# Patient Record
Sex: FEMALE | Race: BLACK OR AFRICAN AMERICAN | NOT HISPANIC OR LATINO | URBAN - METROPOLITAN AREA
[De-identification: names, ages, dates, MRNs, and addresses within clinical notes are randomized per-mention and may not be internally consistent; named-entity substitution may affect disease eponyms.]

---

## 2017-01-27 ENCOUNTER — INPATIENT (INPATIENT)
Facility: HOSPITAL | Age: 32
LOS: 2 days | Discharge: HOME | End: 2017-01-30
Attending: OBSTETRICS & GYNECOLOGY | Admitting: OBSTETRICS & GYNECOLOGY

## 2017-01-27 DIAGNOSIS — Z3A.00 WEEKS OF GESTATION OF PREGNANCY NOT SPECIFIED: ICD-10-CM

## 2017-01-27 DIAGNOSIS — O26.90 PREGNANCY RELATED CONDITIONS, UNSPECIFIED, UNSPECIFIED TRIMESTER: ICD-10-CM

## 2017-01-28 ENCOUNTER — TRANSCRIPTION ENCOUNTER (OUTPATIENT)
Age: 32
End: 2017-01-28

## 2017-01-28 VITALS
DIASTOLIC BLOOD PRESSURE: 55 MMHG | RESPIRATION RATE: 17 BRPM | HEART RATE: 85 BPM | TEMPERATURE: 98 F | SYSTOLIC BLOOD PRESSURE: 111 MMHG

## 2017-01-28 LAB
BASOPHILS # BLD AUTO: 0.01 K/UL — SIGNIFICANT CHANGE UP (ref 0–0.2)
BASOPHILS NFR BLD AUTO: 0.2 % — SIGNIFICANT CHANGE UP (ref 0–2)
BLD GP AB SCN SERPL QL: NEGATIVE — SIGNIFICANT CHANGE UP
EOSINOPHIL # BLD AUTO: 0.03 K/UL — SIGNIFICANT CHANGE UP (ref 0–0.5)
EOSINOPHIL NFR BLD AUTO: 0.5 % — SIGNIFICANT CHANGE UP (ref 0–6)
HCT VFR BLD CALC: 31.7 % — LOW (ref 34.5–45)
HGB BLD-MCNC: 10.6 G/DL — LOW (ref 11.5–15.5)
IMM GRANULOCYTES NFR BLD AUTO: 0 % — SIGNIFICANT CHANGE UP (ref 0–1.5)
LYMPHOCYTES # BLD AUTO: 1.4 K/UL — SIGNIFICANT CHANGE UP (ref 1–3.3)
LYMPHOCYTES # BLD AUTO: 25 % — SIGNIFICANT CHANGE UP (ref 13–44)
MCHC RBC-ENTMCNC: 29.4 PG — SIGNIFICANT CHANGE UP (ref 27–34)
MCHC RBC-ENTMCNC: 33.4 % — SIGNIFICANT CHANGE UP (ref 32–36)
MCV RBC AUTO: 88.1 FL — SIGNIFICANT CHANGE UP (ref 80–100)
MONOCYTES # BLD AUTO: 0.46 K/UL — SIGNIFICANT CHANGE UP (ref 0–0.9)
MONOCYTES NFR BLD AUTO: 8.2 % — SIGNIFICANT CHANGE UP (ref 2–14)
NEUTROPHILS # BLD AUTO: 3.69 K/UL — SIGNIFICANT CHANGE UP (ref 1.8–7.4)
NEUTROPHILS NFR BLD AUTO: 66.1 % — SIGNIFICANT CHANGE UP (ref 43–77)
PLATELET # BLD AUTO: 246 K/UL — SIGNIFICANT CHANGE UP (ref 150–400)
PMV BLD: 12.4 FL — SIGNIFICANT CHANGE UP (ref 7–13)
RBC # BLD: 3.6 M/UL — LOW (ref 3.8–5.2)
RBC # FLD: 13.3 % — SIGNIFICANT CHANGE UP (ref 10.3–14.5)
RH IG SCN BLD-IMP: POSITIVE — SIGNIFICANT CHANGE UP
RH IG SCN BLD-IMP: POSITIVE — SIGNIFICANT CHANGE UP
T PALLIDUM AB TITR SER: NEGATIVE — SIGNIFICANT CHANGE UP
WBC # BLD: 5.59 K/UL — SIGNIFICANT CHANGE UP (ref 3.8–10.5)
WBC # FLD AUTO: 5.59 K/UL — SIGNIFICANT CHANGE UP (ref 3.8–10.5)

## 2017-01-28 RX ORDER — CITRIC ACID/SODIUM CITRATE 300-500 MG
15 SOLUTION, ORAL ORAL EVERY 4 HOURS
Qty: 0 | Refills: 0 | Status: DISCONTINUED | OUTPATIENT
Start: 2017-01-28 | End: 2017-01-28

## 2017-01-28 RX ORDER — IBUPROFEN 200 MG
600 TABLET ORAL EVERY 6 HOURS
Qty: 0 | Refills: 0 | Status: DISCONTINUED | OUTPATIENT
Start: 2017-01-28 | End: 2017-01-30

## 2017-01-28 RX ORDER — DIPHENHYDRAMINE HCL 50 MG
25 CAPSULE ORAL EVERY 6 HOURS
Qty: 0 | Refills: 0 | Status: DISCONTINUED | OUTPATIENT
Start: 2017-01-28 | End: 2017-01-30

## 2017-01-28 RX ORDER — LANOLIN
1 OINTMENT (GRAM) TOPICAL EVERY 6 HOURS
Qty: 0 | Refills: 0 | Status: DISCONTINUED | OUTPATIENT
Start: 2017-01-28 | End: 2017-01-30

## 2017-01-28 RX ORDER — SODIUM CHLORIDE 9 MG/ML
3 INJECTION INTRAMUSCULAR; INTRAVENOUS; SUBCUTANEOUS EVERY 8 HOURS
Qty: 0 | Refills: 0 | Status: DISCONTINUED | OUTPATIENT
Start: 2017-01-28 | End: 2017-01-30

## 2017-01-28 RX ORDER — SODIUM CHLORIDE 9 MG/ML
1000 INJECTION, SOLUTION INTRAVENOUS ONCE
Qty: 0 | Refills: 0 | Status: DISCONTINUED | OUTPATIENT
Start: 2017-01-28 | End: 2017-01-28

## 2017-01-28 RX ORDER — OXYTOCIN 10 UNIT/ML
41.67 VIAL (ML) INJECTION
Qty: 20 | Refills: 0 | Status: DISCONTINUED | OUTPATIENT
Start: 2017-01-28 | End: 2017-01-30

## 2017-01-28 RX ORDER — SODIUM CHLORIDE 9 MG/ML
1000 INJECTION, SOLUTION INTRAVENOUS
Qty: 0 | Refills: 0 | Status: DISCONTINUED | OUTPATIENT
Start: 2017-01-28 | End: 2017-01-28

## 2017-01-28 RX ORDER — TETANUS TOXOID, REDUCED DIPHTHERIA TOXOID AND ACELLULAR PERTUSSIS VACCINE, ADSORBED 5; 2.5; 8; 8; 2.5 [IU]/.5ML; [IU]/.5ML; UG/.5ML; UG/.5ML; UG/.5ML
0.5 SUSPENSION INTRAMUSCULAR ONCE
Qty: 0 | Refills: 0 | Status: DISCONTINUED | OUTPATIENT
Start: 2017-01-28 | End: 2017-01-30

## 2017-01-28 RX ORDER — AER TRAVELER 0.5 G/1
1 SOLUTION RECTAL; TOPICAL EVERY 4 HOURS
Qty: 0 | Refills: 0 | Status: DISCONTINUED | OUTPATIENT
Start: 2017-01-28 | End: 2017-01-30

## 2017-01-28 RX ORDER — SODIUM CHLORIDE 9 MG/ML
3 INJECTION INTRAMUSCULAR; INTRAVENOUS; SUBCUTANEOUS EVERY 8 HOURS
Qty: 0 | Refills: 0 | Status: DISCONTINUED | OUTPATIENT
Start: 2017-01-28 | End: 2017-01-28

## 2017-01-28 RX ORDER — OXYTOCIN 10 UNIT/ML
333.3 VIAL (ML) INJECTION
Qty: 20 | Refills: 0 | Status: DISCONTINUED | OUTPATIENT
Start: 2017-01-28 | End: 2017-01-28

## 2017-01-28 RX ORDER — KETOROLAC TROMETHAMINE 30 MG/ML
30 SYRINGE (ML) INJECTION ONCE
Qty: 0 | Refills: 0 | Status: DISCONTINUED | OUTPATIENT
Start: 2017-01-28 | End: 2017-01-30

## 2017-01-28 RX ORDER — OXYCODONE HYDROCHLORIDE 5 MG/1
5 TABLET ORAL
Qty: 0 | Refills: 0 | Status: DISCONTINUED | OUTPATIENT
Start: 2017-01-28 | End: 2017-01-30

## 2017-01-28 RX ORDER — MAGNESIUM HYDROXIDE 400 MG/1
30 TABLET, CHEWABLE ORAL
Qty: 0 | Refills: 0 | Status: DISCONTINUED | OUTPATIENT
Start: 2017-01-28 | End: 2017-01-30

## 2017-01-28 RX ORDER — IBUPROFEN 200 MG
1 TABLET ORAL
Qty: 0 | Refills: 0 | COMMUNITY
Start: 2017-01-28

## 2017-01-28 RX ORDER — PRAMOXINE HYDROCHLORIDE 150 MG/15G
1 AEROSOL, FOAM RECTAL EVERY 4 HOURS
Qty: 0 | Refills: 0 | Status: DISCONTINUED | OUTPATIENT
Start: 2017-01-28 | End: 2017-01-28

## 2017-01-28 RX ORDER — AER TRAVELER 0.5 G/1
1 SOLUTION RECTAL; TOPICAL EVERY 4 HOURS
Qty: 0 | Refills: 0 | Status: DISCONTINUED | OUTPATIENT
Start: 2017-01-28 | End: 2017-01-28

## 2017-01-28 RX ORDER — DIBUCAINE 1 %
1 OINTMENT (GRAM) RECTAL EVERY 4 HOURS
Qty: 0 | Refills: 0 | Status: DISCONTINUED | OUTPATIENT
Start: 2017-01-28 | End: 2017-01-28

## 2017-01-28 RX ORDER — HYDROCORTISONE 1 %
1 OINTMENT (GRAM) TOPICAL EVERY 4 HOURS
Qty: 0 | Refills: 0 | Status: DISCONTINUED | OUTPATIENT
Start: 2017-01-28 | End: 2017-01-28

## 2017-01-28 RX ORDER — INFLUENZA VIRUS VACCINE 15; 15; 15; 15 UG/.5ML; UG/.5ML; UG/.5ML; UG/.5ML
0.5 SUSPENSION INTRAMUSCULAR ONCE
Qty: 0 | Refills: 0 | Status: DISCONTINUED | OUTPATIENT
Start: 2017-01-28 | End: 2017-01-30

## 2017-01-28 RX ORDER — ACETAMINOPHEN 500 MG
3 TABLET ORAL
Qty: 0 | Refills: 0 | DISCHARGE
Start: 2017-01-28

## 2017-01-28 RX ORDER — PRAMOXINE HYDROCHLORIDE 150 MG/15G
1 AEROSOL, FOAM RECTAL EVERY 4 HOURS
Qty: 0 | Refills: 0 | Status: DISCONTINUED | OUTPATIENT
Start: 2017-01-28 | End: 2017-01-30

## 2017-01-28 RX ORDER — DOCUSATE SODIUM 100 MG
100 CAPSULE ORAL
Qty: 0 | Refills: 0 | Status: DISCONTINUED | OUTPATIENT
Start: 2017-01-28 | End: 2017-01-30

## 2017-01-28 RX ORDER — ACETAMINOPHEN 500 MG
975 TABLET ORAL EVERY 6 HOURS
Qty: 0 | Refills: 0 | Status: DISCONTINUED | OUTPATIENT
Start: 2017-01-28 | End: 2017-01-30

## 2017-01-28 RX ORDER — DIBUCAINE 1 %
1 OINTMENT (GRAM) RECTAL EVERY 4 HOURS
Qty: 0 | Refills: 0 | Status: DISCONTINUED | OUTPATIENT
Start: 2017-01-28 | End: 2017-01-30

## 2017-01-28 RX ORDER — OXYTOCIN 10 UNIT/ML
41.67 VIAL (ML) INJECTION
Qty: 20 | Refills: 0 | Status: DISCONTINUED | OUTPATIENT
Start: 2017-01-28 | End: 2017-01-28

## 2017-01-28 RX ORDER — SIMETHICONE 80 MG/1
80 TABLET, CHEWABLE ORAL EVERY 6 HOURS
Qty: 0 | Refills: 0 | Status: DISCONTINUED | OUTPATIENT
Start: 2017-01-28 | End: 2017-01-30

## 2017-01-28 RX ORDER — GLYCERIN ADULT
1 SUPPOSITORY, RECTAL RECTAL AT BEDTIME
Qty: 0 | Refills: 0 | Status: DISCONTINUED | OUTPATIENT
Start: 2017-01-28 | End: 2017-01-30

## 2017-01-28 RX ORDER — HYDROCORTISONE 1 %
1 OINTMENT (GRAM) TOPICAL EVERY 4 HOURS
Qty: 0 | Refills: 0 | Status: DISCONTINUED | OUTPATIENT
Start: 2017-01-28 | End: 2017-01-30

## 2017-01-28 RX ADMIN — SODIUM CHLORIDE 3 MILLILITER(S): 9 INJECTION INTRAMUSCULAR; INTRAVENOUS; SUBCUTANEOUS at 06:42

## 2017-01-28 NOTE — DISCHARGE NOTE OB - PLAN OF CARE
complete recovery regular diet, nothing per vagina, activities as tolerated, follow up with obstetrician in 6 weeks

## 2017-01-28 NOTE — DISCHARGE NOTE OB - PATIENT PORTAL LINK FT
“You can access the Follow[facility] Patient Portal, offered by [facility], by registering with the following website: [email or web address]”

## 2017-01-28 NOTE — DISCHARGE NOTE OB - CARE PROVIDER_API CALL
Kohanim, Behnam (MD), Obstetrics and Gynecology  [address] [facility] [address] [place] [address]  Phone: [phone]  Fax: [phone]

## 2017-01-28 NOTE — DISCHARGE NOTE OB - CARE PLAN
Principal Discharge DX:	Vaginal delivery  Goal:	complete recovery  Instructions for follow-up, activity and diet:	regular diet, nothing per vagina, activities as tolerated, follow up with obstetrician in 6 weeks

## 2017-01-28 NOTE — DISCHARGE NOTE OB - HOSPITAL COURSE
Patient was admitted in labor. She underwent vaginal delivery of a viable girl. She had uncomplicated hospital course. Patient was discharged home to follow up with obstetrician.

## 2017-01-28 NOTE — DISCHARGE NOTE OB - MEDICATION SUMMARY - MEDICATIONS TO TAKE
I will START or STAY ON the medications listed below when I get home from the hospital:    acetaminophen 325 mg oral tablet  -- 3 tab(s) by mouth every 6 hours  -- Indication: For pain    ibuprofen 600 mg oral tablet  -- 1 tab(s) by mouth every 6 hours  -- Indication: For pain    Prenatal Multivitamins with Folic Acid 1 mg oral tablet  -- 1 tab(s) by mouth once a day  -- Indication: For vitamins I will START or STAY ON the medications listed below when I get home from the hospital:    acetaminophen 325 mg oral tablet  -- 3 tab(s) by mouth every 6 hours  -- Indication: For pain    ibuprofen 600 mg oral tablet  -- 1 tab(s) by mouth every 6 hours, As Needed  -- Indication: For Vaginal delivery    Prenatal Multivitamins with Folic Acid 1 mg oral tablet  -- 1 tab(s) by mouth once a day  -- Indication: For Vaginal delivery

## 2017-01-29 RX ADMIN — Medication 600 MILLIGRAM(S): at 17:39

## 2017-01-29 RX ADMIN — Medication 1 TABLET(S): at 16:43

## 2017-01-29 RX ADMIN — Medication 600 MILLIGRAM(S): at 16:44

## 2017-01-30 VITALS
TEMPERATURE: 98 F | HEART RATE: 73 BPM | RESPIRATION RATE: 18 BRPM | DIASTOLIC BLOOD PRESSURE: 63 MMHG | OXYGEN SATURATION: 100 % | SYSTOLIC BLOOD PRESSURE: 109 MMHG

## 2017-01-30 RX ORDER — IBUPROFEN 200 MG
1 TABLET ORAL
Qty: 20 | Refills: 0
Start: 2017-01-30

## 2017-01-30 RX ADMIN — Medication 600 MILLIGRAM(S): at 02:00

## 2017-01-30 RX ADMIN — Medication 975 MILLIGRAM(S): at 02:00

## 2017-01-30 RX ADMIN — Medication 975 MILLIGRAM(S): at 00:54

## 2017-01-30 RX ADMIN — Medication 1 TABLET(S): at 12:55

## 2017-01-30 RX ADMIN — Medication 600 MILLIGRAM(S): at 00:54

## 2018-01-01 ENCOUNTER — EMERGENCY (EMERGENCY)
Facility: HOSPITAL | Age: 33
LOS: 1 days | Discharge: HOME | End: 2018-01-01
Attending: EMERGENCY MEDICINE | Admitting: EMERGENCY MEDICINE
Payer: MEDICAID

## 2018-01-01 VITALS
HEART RATE: 73 BPM | OXYGEN SATURATION: 100 % | TEMPERATURE: 98 F | DIASTOLIC BLOOD PRESSURE: 89 MMHG | RESPIRATION RATE: 18 BRPM | SYSTOLIC BLOOD PRESSURE: 109 MMHG

## 2018-01-01 VITALS
OXYGEN SATURATION: 100 % | RESPIRATION RATE: 16 BRPM | HEART RATE: 74 BPM | SYSTOLIC BLOOD PRESSURE: 128 MMHG | DIASTOLIC BLOOD PRESSURE: 60 MMHG

## 2018-01-01 LAB
ALBUMIN SERPL ELPH-MCNC: 3.8 G/DL — SIGNIFICANT CHANGE UP (ref 3.3–5)
ALP SERPL-CCNC: 84 U/L — SIGNIFICANT CHANGE UP (ref 40–120)
ALT FLD-CCNC: 18 U/L — SIGNIFICANT CHANGE UP (ref 4–33)
APTT BLD: 32 SEC — SIGNIFICANT CHANGE UP (ref 27.5–37.4)
AST SERPL-CCNC: 34 U/L — HIGH (ref 4–32)
BASOPHILS # BLD AUTO: 0.01 K/UL — SIGNIFICANT CHANGE UP (ref 0–0.2)
BASOPHILS NFR BLD AUTO: 0.2 % — SIGNIFICANT CHANGE UP (ref 0–2)
BILIRUB SERPL-MCNC: 0.4 MG/DL — SIGNIFICANT CHANGE UP (ref 0.2–1.2)
BUN SERPL-MCNC: 9 MG/DL — SIGNIFICANT CHANGE UP (ref 7–23)
CALCIUM SERPL-MCNC: 8.3 MG/DL — LOW (ref 8.4–10.5)
CHLORIDE SERPL-SCNC: 104 MMOL/L — SIGNIFICANT CHANGE UP (ref 98–107)
CO2 SERPL-SCNC: 26 MMOL/L — SIGNIFICANT CHANGE UP (ref 22–31)
CREAT SERPL-MCNC: 0.67 MG/DL — SIGNIFICANT CHANGE UP (ref 0.5–1.3)
EOSINOPHIL # BLD AUTO: 0.02 K/UL — SIGNIFICANT CHANGE UP (ref 0–0.5)
EOSINOPHIL NFR BLD AUTO: 0.3 % — SIGNIFICANT CHANGE UP (ref 0–6)
GLUCOSE SERPL-MCNC: 106 MG/DL — HIGH (ref 70–99)
HCT VFR BLD CALC: 33.6 % — LOW (ref 34.5–45)
HGB BLD-MCNC: 10.8 G/DL — LOW (ref 11.5–15.5)
IMM GRANULOCYTES # BLD AUTO: 0.01 # — SIGNIFICANT CHANGE UP
IMM GRANULOCYTES NFR BLD AUTO: 0.2 % — SIGNIFICANT CHANGE UP (ref 0–1.5)
INR BLD: 1.16 — SIGNIFICANT CHANGE UP (ref 0.88–1.17)
LIDOCAIN IGE QN: 33.8 U/L — SIGNIFICANT CHANGE UP (ref 7–60)
LYMPHOCYTES # BLD AUTO: 0.95 K/UL — LOW (ref 1–3.3)
LYMPHOCYTES # BLD AUTO: 15.7 % — SIGNIFICANT CHANGE UP (ref 13–44)
MCHC RBC-ENTMCNC: 27.1 PG — SIGNIFICANT CHANGE UP (ref 27–34)
MCHC RBC-ENTMCNC: 32.1 % — SIGNIFICANT CHANGE UP (ref 32–36)
MCV RBC AUTO: 84.4 FL — SIGNIFICANT CHANGE UP (ref 80–100)
MONOCYTES # BLD AUTO: 0.36 K/UL — SIGNIFICANT CHANGE UP (ref 0–0.9)
MONOCYTES NFR BLD AUTO: 5.9 % — SIGNIFICANT CHANGE UP (ref 2–14)
NEUTROPHILS # BLD AUTO: 4.71 K/UL — SIGNIFICANT CHANGE UP (ref 1.8–7.4)
NEUTROPHILS NFR BLD AUTO: 77.7 % — HIGH (ref 43–77)
NRBC # FLD: 0 — SIGNIFICANT CHANGE UP
PLATELET # BLD AUTO: 294 K/UL — SIGNIFICANT CHANGE UP (ref 150–400)
PMV BLD: 11.4 FL — SIGNIFICANT CHANGE UP (ref 7–13)
POTASSIUM SERPL-MCNC: 4.3 MMOL/L — SIGNIFICANT CHANGE UP (ref 3.5–5.3)
POTASSIUM SERPL-SCNC: 4.3 MMOL/L — SIGNIFICANT CHANGE UP (ref 3.5–5.3)
PROT SERPL-MCNC: 8.6 G/DL — HIGH (ref 6–8.3)
PROTHROM AB SERPL-ACNC: 12.9 SEC — SIGNIFICANT CHANGE UP (ref 9.8–13.1)
RBC # BLD: 3.98 M/UL — SIGNIFICANT CHANGE UP (ref 3.8–5.2)
RBC # FLD: 15.2 % — HIGH (ref 10.3–14.5)
SODIUM SERPL-SCNC: 141 MMOL/L — SIGNIFICANT CHANGE UP (ref 135–145)
WBC # BLD: 6.06 K/UL — SIGNIFICANT CHANGE UP (ref 3.8–10.5)
WBC # FLD AUTO: 6.06 K/UL — SIGNIFICANT CHANGE UP (ref 3.8–10.5)

## 2018-01-01 PROCEDURE — 76705 ECHO EXAM OF ABDOMEN: CPT | Mod: 26

## 2018-01-01 PROCEDURE — 99285 EMERGENCY DEPT VISIT HI MDM: CPT

## 2018-01-01 PROCEDURE — 71046 X-RAY EXAM CHEST 2 VIEWS: CPT | Mod: 26

## 2018-01-01 RX ORDER — ONDANSETRON 8 MG/1
4 TABLET, FILM COATED ORAL ONCE
Qty: 0 | Refills: 0 | Status: COMPLETED | OUTPATIENT
Start: 2018-01-01 | End: 2018-01-01

## 2018-01-01 RX ORDER — SODIUM CHLORIDE 9 MG/ML
1000 INJECTION INTRAMUSCULAR; INTRAVENOUS; SUBCUTANEOUS ONCE
Qty: 0 | Refills: 0 | Status: COMPLETED | OUTPATIENT
Start: 2018-01-01 | End: 2018-01-01

## 2018-01-01 RX ORDER — MORPHINE SULFATE 50 MG/1
6 CAPSULE, EXTENDED RELEASE ORAL ONCE
Qty: 0 | Refills: 0 | Status: DISCONTINUED | OUTPATIENT
Start: 2018-01-01 | End: 2018-01-01

## 2018-01-01 RX ADMIN — MORPHINE SULFATE 6 MILLIGRAM(S): 50 CAPSULE, EXTENDED RELEASE ORAL at 10:30

## 2018-01-01 RX ADMIN — ONDANSETRON 4 MILLIGRAM(S): 8 TABLET, FILM COATED ORAL at 10:30

## 2018-01-01 RX ADMIN — SODIUM CHLORIDE 1000 MILLILITER(S): 9 INJECTION INTRAMUSCULAR; INTRAVENOUS; SUBCUTANEOUS at 10:30

## 2018-01-01 NOTE — ED PROVIDER NOTE - OBJECTIVE STATEMENT
32y F with PMHx acid reflux presents to the ED for abd pain that radiates to back since 5AM this morning. States pain woke pt up. Pain is constant. Has nausea and diarrhea. Diarrhea x5 times. Last ate yesterday night. Supposed to be on acid reflux medication but has not been taking for 1 year. No allergies. LMP last month and no chance of pregnancy. No vaginal discharge or bleeding, vomiting, or urinary s/x. Last pregnant 1 year ago. Still has gallbladder 32y F with PMHx acid reflux off meds for a year presents to the ED for abd pain that radiates to back since 5AM this morning. States pain woke pt up. Pain is constant located in the epigastric radiate under the right breat associated w  nausea and NBNB diarrhea. Diarrhea x5 times. Last ate yesterday night at 9pm. No allergies. LMP last month and no chance of pregnancy. No vaginal discharge or bleeding, vomiting, or urinary s/x. Last pregnant 1 year ago. no cp/sob/palpitations. no travel no sick contact.

## 2018-01-01 NOTE — ED ADULT NURSE NOTE - OBJECTIVE STATEMENT
pt in intake. alert,oriented x3. skin warm,dry. c/o ruq pains since this am. denies n,v. med as ordered, eval by md. iv access,labs sent, pt to u/s

## 2018-01-01 NOTE — ED PROVIDER NOTE - PROGRESS NOTE DETAILS
pt sitting up states "I feel much much better" no nausea/vomit. pending us pt no distress. no nausea/vomit. no abdominal pain. soft nontender, nondistended abdomen. Dw w pt and provided copies of results to pt. she would like to go home. Recommend she come back for any fever/vomit or pain. pt comfortable  w plan

## 2018-01-01 NOTE — ED PROVIDER NOTE - PHYSICAL EXAMINATION
pt nontoxic, tearful w exam due to pain. non icteric. mmm. normal S1-S2 No resp distress. able to speak in full and clear sentences. no wheeze, rales or stridor. soft obese female tender  RUQ +murphys sign +voluntary guarding no rebound. no cvat pt nontoxic, tearful w exam due to pain. non icteric. mmm. normal S1-S2 No resp distress. able to speak in full and clear sentences. no wheeze, rales or stridor. soft obese female tender  RUQ +murphys sign  no guarding no rebound. no cvat

## 2018-01-01 NOTE — ED ADULT TRIAGE NOTE - CHIEF COMPLAINT QUOTE
Pt. c/o epigastric pain radiating to back with diarrhea starting around 5 AM. Denies n/v, cp or sob.

## 2018-11-01 ENCOUNTER — OUTPATIENT (OUTPATIENT)
Dept: OUTPATIENT SERVICES | Facility: HOSPITAL | Age: 33
LOS: 1 days | End: 2018-11-01
Payer: MEDICAID

## 2018-11-01 PROCEDURE — G9001: CPT

## 2018-11-21 ENCOUNTER — EMERGENCY (EMERGENCY)
Facility: HOSPITAL | Age: 33
LOS: 1 days | Discharge: HOME | End: 2018-11-21
Attending: EMERGENCY MEDICINE | Admitting: EMERGENCY MEDICINE
Payer: MEDICAID

## 2018-11-21 VITALS
OXYGEN SATURATION: 100 % | TEMPERATURE: 99 F | SYSTOLIC BLOOD PRESSURE: 119 MMHG | HEART RATE: 97 BPM | RESPIRATION RATE: 16 BRPM | DIASTOLIC BLOOD PRESSURE: 81 MMHG

## 2018-11-21 LAB
ALBUMIN SERPL ELPH-MCNC: 3.5 G/DL — SIGNIFICANT CHANGE UP (ref 3.3–5)
ALP SERPL-CCNC: 73 U/L — SIGNIFICANT CHANGE UP (ref 40–120)
ALT FLD-CCNC: 4 U/L — SIGNIFICANT CHANGE UP (ref 4–33)
APTT BLD: 32 SEC — SIGNIFICANT CHANGE UP (ref 27.5–36.3)
AST SERPL-CCNC: 12 U/L — SIGNIFICANT CHANGE UP (ref 4–32)
BASOPHILS # BLD AUTO: 0.01 K/UL — SIGNIFICANT CHANGE UP (ref 0–0.2)
BASOPHILS NFR BLD AUTO: 0.2 % — SIGNIFICANT CHANGE UP (ref 0–2)
BILIRUB SERPL-MCNC: 0.5 MG/DL — SIGNIFICANT CHANGE UP (ref 0.2–1.2)
BUN SERPL-MCNC: 5 MG/DL — LOW (ref 7–23)
CALCIUM SERPL-MCNC: 8.3 MG/DL — LOW (ref 8.4–10.5)
CHLORIDE SERPL-SCNC: 103 MMOL/L — SIGNIFICANT CHANGE UP (ref 98–107)
CO2 SERPL-SCNC: 25 MMOL/L — SIGNIFICANT CHANGE UP (ref 22–31)
CREAT SERPL-MCNC: 0.7 MG/DL — SIGNIFICANT CHANGE UP (ref 0.5–1.3)
EOSINOPHIL # BLD AUTO: 0.07 K/UL — SIGNIFICANT CHANGE UP (ref 0–0.5)
EOSINOPHIL NFR BLD AUTO: 1.2 % — SIGNIFICANT CHANGE UP (ref 0–6)
GLUCOSE SERPL-MCNC: 93 MG/DL — SIGNIFICANT CHANGE UP (ref 70–99)
HCT VFR BLD CALC: 34 % — LOW (ref 34.5–45)
HGB BLD-MCNC: 11.3 G/DL — LOW (ref 11.5–15.5)
IMM GRANULOCYTES # BLD AUTO: 0.02 # — SIGNIFICANT CHANGE UP
IMM GRANULOCYTES NFR BLD AUTO: 0.3 % — SIGNIFICANT CHANGE UP (ref 0–1.5)
INR BLD: 1.21 — HIGH (ref 0.88–1.17)
LYMPHOCYTES # BLD AUTO: 1.21 K/UL — SIGNIFICANT CHANGE UP (ref 1–3.3)
LYMPHOCYTES # BLD AUTO: 21 % — SIGNIFICANT CHANGE UP (ref 13–44)
MCHC RBC-ENTMCNC: 28.7 PG — SIGNIFICANT CHANGE UP (ref 27–34)
MCHC RBC-ENTMCNC: 33.2 % — SIGNIFICANT CHANGE UP (ref 32–36)
MCV RBC AUTO: 86.3 FL — SIGNIFICANT CHANGE UP (ref 80–100)
MONOCYTES # BLD AUTO: 0.52 K/UL — SIGNIFICANT CHANGE UP (ref 0–0.9)
MONOCYTES NFR BLD AUTO: 9 % — SIGNIFICANT CHANGE UP (ref 2–14)
NEUTROPHILS # BLD AUTO: 3.94 K/UL — SIGNIFICANT CHANGE UP (ref 1.8–7.4)
NEUTROPHILS NFR BLD AUTO: 68.3 % — SIGNIFICANT CHANGE UP (ref 43–77)
NRBC # FLD: 0 — SIGNIFICANT CHANGE UP
PLATELET # BLD AUTO: 248 K/UL — SIGNIFICANT CHANGE UP (ref 150–400)
PMV BLD: 11.6 FL — SIGNIFICANT CHANGE UP (ref 7–13)
POTASSIUM SERPL-MCNC: 4.3 MMOL/L — SIGNIFICANT CHANGE UP (ref 3.5–5.3)
POTASSIUM SERPL-SCNC: 4.3 MMOL/L — SIGNIFICANT CHANGE UP (ref 3.5–5.3)
PROT SERPL-MCNC: 8.4 G/DL — HIGH (ref 6–8.3)
PROTHROM AB SERPL-ACNC: 13.9 SEC — HIGH (ref 9.8–13.1)
RBC # BLD: 3.94 M/UL — SIGNIFICANT CHANGE UP (ref 3.8–5.2)
RBC # FLD: 14.2 % — SIGNIFICANT CHANGE UP (ref 10.3–14.5)
SODIUM SERPL-SCNC: 137 MMOL/L — SIGNIFICANT CHANGE UP (ref 135–145)
WBC # BLD: 5.77 K/UL — SIGNIFICANT CHANGE UP (ref 3.8–10.5)
WBC # FLD AUTO: 5.77 K/UL — SIGNIFICANT CHANGE UP (ref 3.8–10.5)

## 2018-11-21 PROCEDURE — 99284 EMERGENCY DEPT VISIT MOD MDM: CPT | Mod: 25

## 2018-11-21 PROCEDURE — 93971 EXTREMITY STUDY: CPT | Mod: 26,LT

## 2018-11-21 RX ORDER — CYCLOBENZAPRINE HYDROCHLORIDE 10 MG/1
1 TABLET, FILM COATED ORAL
Qty: 9 | Refills: 0 | OUTPATIENT
Start: 2018-11-21 | End: 2018-11-23

## 2018-11-21 RX ORDER — APIXABAN 2.5 MG/1
1 TABLET, FILM COATED ORAL
Qty: 60 | Refills: 0
Start: 2018-11-21

## 2018-11-21 RX ORDER — APIXABAN 2.5 MG/1
1 TABLET, FILM COATED ORAL
Qty: 60 | Refills: 0 | OUTPATIENT
Start: 2018-11-21

## 2018-11-21 RX ORDER — IBUPROFEN 200 MG
1 TABLET ORAL
Qty: 15 | Refills: 0
Start: 2018-11-21 | End: 2018-11-25

## 2018-11-21 RX ORDER — DIAZEPAM 5 MG
1 TABLET ORAL
Qty: 6 | Refills: 0
Start: 2018-11-21

## 2018-11-21 RX ORDER — DIAZEPAM 5 MG
1 TABLET ORAL
Qty: 6 | Refills: 0
Start: 2018-11-21 | End: 2018-11-22

## 2018-11-21 RX ORDER — IBUPROFEN 200 MG
800 TABLET ORAL ONCE
Qty: 0 | Refills: 0 | Status: COMPLETED | OUTPATIENT
Start: 2018-11-21 | End: 2018-11-21

## 2018-11-21 RX ORDER — APIXABAN 2.5 MG/1
10 TABLET, FILM COATED ORAL
Qty: 1 | Refills: 0
Start: 2018-11-21 | End: 2018-12-20

## 2018-11-21 RX ADMIN — Medication 800 MILLIGRAM(S): at 05:37

## 2018-11-21 NOTE — ED ADULT TRIAGE NOTE - CHIEF COMPLAINT QUOTE
Pt comes in for c/o left leg pain x1 week, reporting that she thinks she pulled a muscle and the pain continues to progress. Pt appears in no acute distress, pt ambulatory in triage and vs as noted

## 2018-11-21 NOTE — ED POST DISCHARGE NOTE - REASON FOR FOLLOW-UP
Other Pt called that [facility] didn't received rx for Eliquis - the pharmacy called by the writer - closed, unable to verify if rx there, another [facility] provided by [facility] [place] [facility], rx sent  but as per pharmacist,. rx already pending at [facility] and will not be covered by the insurance as there is already claim on it ([facility] closed, unable to d/c medications); I called the pt and advised to either come to the ER to obtain dose for today and she could  rx from [facility] tomorrow or I could sent rx for 2 pills (price $20) and pt could  the rest tomorrow. pt choose to return to er.

## 2018-11-21 NOTE — ED PROVIDER NOTE - CARE PLAN
Principal Discharge DX:	DVT (deep venous thrombosis)  Assessment and plan of treatment:	Follow up with your primary physician for a post hospital visit within 48 hours, taking all results from the ER to be reviewed. Start eliquis 10 mg twice a day for the first 7 days then change to 5 mg twice a day.  If any concerning or new signs or symptoms return to the ER Principal Discharge DX:	DVT (deep venous thrombosis)  Assessment and plan of treatment:	Follow up with your primary physician for a post hospital visit within 48 hours, taking all results from the ER to be reviewed. Start eliquis 10 mg twice a day for the first 7 days then change to 5 mg twice a day.  If any concerning or new signs or symptoms return to the ER. Heme referral list provided

## 2018-11-21 NOTE — ED ADULT NURSE NOTE - OBJECTIVE STATEMENT
received pt to intake AOX3 in no apparent distress. Pt c/o L calf pain x2 weeks and worsening. Pt states pain is worse when she moves leg or attempts to put pressure on it. Pt denies any traum or injury. Pt denies any other symptoms such as chest pain, weakness, difficulty breathing, impaired gait. Pt able to ambulate to stretcher with minimal assistance, no obvious deformity or bruising, no neuro deficits. pt appears uncomfortable medicated for pain as per provider order, pt taken to US will continue to monitor for pain

## 2018-11-21 NOTE — ED PROVIDER NOTE - PROGRESS NOTE DETAILS
[facility], ok with pt being dc on eliquis.   Ok to dc home as dw attending jay , rx sent to pharmacy [facility], ok with pt being dc on eliquis.   Ok to dc home as dw attending Scofi , rx sent to pharmacy

## 2018-11-21 NOTE — ED POST DISCHARGE NOTE - ADDITIONAL DOCUMENTATION
Of note, when I sent Rx's through, defaulted to [facility] (pharmacy is closed at time of sending).  Message left with ED Admin Team for f/u in am to cancel all Rxs at [facility].  Ultimately, sent Rxs to [facility] without incident as per above.

## 2018-11-21 NOTE — ED POST DISCHARGE NOTE - RESULT SUMMARY
Pt. contacted the ED; states Rx's are not at her pharmacy; pt provides info for [facility] pharmacy [id number].  I reviewed Prescription Writer; pt had Rxs for Eliquis, Valium, and ibuprofen sent to [facility].  I advised pt not to take ibuprofen or other NSAIDS due to increased risk of bleeding; pt advised to instead take Tylenol PRN for discomfort and follow package instructions.  Rxs for Valium and Eliquis were sent to [facility] at info provided by pt; Rxs went through with "successful" status showing.  Pt. was advised to f/u with her PMD in the next few days.  Pt. verbalized understanding to all as discussed.

## 2018-11-21 NOTE — ED PROVIDER NOTE - OBJECTIVE STATEMENT
32 y/o female no PMH presents to ED c/o left leg/calf pain x 2 weeks. Pt. states over the past 2 weeks she has been experiencing worsening pain and cramping sensation to her left calf - states 2 weeks ago had a bad calf cramp but evantually went away but since has been experiencing pain to area, pain worse at times with ambulation - has been taking aspirin with minimal relief. Denies recent trauma or ovruse- states is on her feet all day for work. Denies fever chills redness swelling trauma to area cp sob, recent travel or ocps. 34 y/o female no PMH presents to ED c/o left leg/calf pain x 2 weeks. Pt. states over the past 2 weeks she has been experiencing worsening pain and cramping sensation to her left calf - states 2 weeks ago had a bad calf cramp but eventually went away but since has been experiencing pain to area, pain worse at times with ambulation - has been taking aspirin with minimal relief. Denies recent trauma or overuse- states is on her feet all day for work. Denies fever chills redness swelling trauma to area cp sob, recent travel.    PMD: Fuad Horton [phone] (office)      [phone] (cell/asnwering service)

## 2018-11-21 NOTE — ED PROVIDER NOTE - PLAN OF CARE
Follow up with your primary physician for a post hospital visit within 48 hours, taking all results from the ER to be reviewed. Start eliquis 10 mg twice a day for the first 7 days then change to 5 mg twice a day.  If any concerning or new signs or symptoms return to the ER Follow up with your primary physician for a post hospital visit within 48 hours, taking all results from the ER to be reviewed. Start eliquis 10 mg twice a day for the first 7 days then change to 5 mg twice a day.  If any concerning or new signs or symptoms return to the ER. Heme referral list provided

## 2018-11-21 NOTE — ED PROVIDER NOTE - MEDICAL DECISION MAKING DETAILS
34 y/o female c/o left calf/leg pain  -possible msk/cramp/strain, r/o DVT  -us duplex, nsaids  -pmd follow up

## 2018-11-21 NOTE — ED PROVIDER NOTE - PHYSICAL EXAMINATION
Gen: Well appearing in NAD  Head: NC/AT  Neck: trachea midline  Resp:  No distress  Ext: no deformities  Neuro:  A&O appears non focal  Skin:  Warm and dry as visualized  Psych:  Normal affect and mood    left leg: no swelling or obv def. no redness, no signs of trauma.+ pain to calf on palpation, no defomrity palpated. sensations intact.

## 2018-11-22 RX ORDER — APIXABAN 2.5 MG/1
1 TABLET, FILM COATED ORAL
Qty: 60 | Refills: 0
Start: 2018-11-22

## 2018-11-27 DIAGNOSIS — Z71.89 OTHER SPECIFIED COUNSELING: ICD-10-CM

## 2018-12-10 ENCOUNTER — APPOINTMENT (OUTPATIENT)
Dept: VASCULAR SURGERY | Facility: CLINIC | Age: 33
End: 2018-12-10
Payer: MEDICARE

## 2018-12-10 ENCOUNTER — TRANSCRIPTION ENCOUNTER (OUTPATIENT)
Age: 33
End: 2018-12-10

## 2018-12-10 VITALS
DIASTOLIC BLOOD PRESSURE: 66 MMHG | BODY MASS INDEX: 45.07 KG/M2 | SYSTOLIC BLOOD PRESSURE: 97 MMHG | HEIGHT: 64 IN | HEART RATE: 76 BPM | TEMPERATURE: 98.5 F | WEIGHT: 264 LBS

## 2018-12-10 PROCEDURE — 93970 EXTREMITY STUDY: CPT

## 2018-12-10 PROCEDURE — 99203 OFFICE O/P NEW LOW 30 MIN: CPT

## 2019-01-03 ENCOUNTER — EMERGENCY (EMERGENCY)
Facility: HOSPITAL | Age: 34
LOS: 1 days | Discharge: HOME | End: 2019-01-03
Attending: EMERGENCY MEDICINE | Admitting: EMERGENCY MEDICINE
Payer: MEDICAID

## 2019-01-03 VITALS
HEART RATE: 102 BPM | TEMPERATURE: 99 F | SYSTOLIC BLOOD PRESSURE: 117 MMHG | DIASTOLIC BLOOD PRESSURE: 70 MMHG | RESPIRATION RATE: 18 BRPM | OXYGEN SATURATION: 100 %

## 2019-01-03 VITALS — HEART RATE: 88 BPM

## 2019-01-03 PROCEDURE — 99283 EMERGENCY DEPT VISIT LOW MDM: CPT | Mod: 25

## 2019-01-03 RX ORDER — ACETAMINOPHEN 500 MG
975 TABLET ORAL ONCE
Qty: 0 | Refills: 0 | Status: COMPLETED | OUTPATIENT
Start: 2019-01-03 | End: 2019-01-03

## 2019-01-03 RX ORDER — IBUPROFEN 200 MG
1 TABLET ORAL
Qty: 20 | Refills: 0
Start: 2019-01-03

## 2019-01-03 RX ORDER — AMOXICILLIN 250 MG/5ML
1 SUSPENSION, RECONSTITUTED, ORAL (ML) ORAL
Qty: 20 | Refills: 0 | OUTPATIENT
Start: 2019-01-03 | End: 2019-01-12

## 2019-01-03 RX ORDER — AMOXICILLIN 250 MG/5ML
1 SUSPENSION, RECONSTITUTED, ORAL (ML) ORAL
Qty: 20 | Refills: 0
Start: 2019-01-03 | End: 2019-01-12

## 2019-01-03 RX ORDER — AMOXICILLIN 250 MG/5ML
500 SUSPENSION, RECONSTITUTED, ORAL (ML) ORAL ONCE
Qty: 0 | Refills: 0 | Status: COMPLETED | OUTPATIENT
Start: 2019-01-03 | End: 2019-01-03

## 2019-01-03 RX ADMIN — Medication 975 MILLIGRAM(S): at 09:42

## 2019-01-03 RX ADMIN — Medication 500 MILLIGRAM(S): at 09:42

## 2019-01-03 NOTE — ED PROVIDER NOTE - MEDICAL DECISION MAKING DETAILS
34yo F PMHX of DVT dx in november on anticoagulation p/w cc of sore throat. Noted b/l exudate with no cough. Concern for pharyngitis/tonsilitis. Analgesia, Antibiotics.

## 2019-01-03 NOTE — ED PROVIDER NOTE - NS ED ROS FT
CONSTITUTIONAL: +fevers, +chills  Mouth/Throat: + sore throat  Cardiovascular: No Chest pain  Respiratory: No SOB  Gastrointestinal: No n/v/d, no abd pain  Genitourinary: no dysuria, no hematuria  SKIN: no rashes.

## 2019-01-03 NOTE — ED PROVIDER NOTE - NSFOLLOWUPINSTRUCTIONS_ED_ALL_ED_FT
Rest and plenty of fluids.  Continue Amoxicillin 500mg 2 times per day.  May take Motrin 600mg every 6 hours as needed for pain. Take with food.

## 2019-01-03 NOTE — ED PROVIDER NOTE - MUSCULOSKELETAL, MLM
Spine appears normal, range of motion is not limited, no muscle or joint tenderness. No calf swelling or tenderness. + distal pulses.

## 2019-01-03 NOTE — ED PROVIDER NOTE - PHYSICAL EXAMINATION
General: no apparent distress, tired appearing   HEENT: Swollen tonsils BL w/ extensive exudates on both tonsils, uvula midline  Neck:  +tender lymphadenopathy, full range of motion, no nuchal rigidity  CV: regular rhythm, normal S1 and S2 with no murmur  Resp: lungs clear to auscultation bilaterally, symmetric chest wall rise  Abd: soft, nontender, nondistended, normoactive bowel sounds, no splenomegaly or hepatomegaly appreciated   : no CVA tenderness  Neuro: moving all extremities

## 2019-01-03 NOTE — ED PROVIDER NOTE - OBJECTIVE STATEMENT
32yo F PMHX of DVT dx in november on anticoagulation p/w cc of sore throat    Pt has had 4 days of sore throat, fevers and chills, no cough, reduced PO intake due to sore throat. Child is sick with similar sx. Took tylenol last night which helped. No N/V/D.

## 2019-01-03 NOTE — ED PROVIDER NOTE - ATTENDING CONTRIBUTION TO CARE
Pt was seen and evaluated by me. Pt is a 34 y/o female with PMHx of DVT presenting with sore throat X 4 days. Pt states over the past 4 days having a sore throat and tactile fevers. Pt denies any cough, SOB, chest pain, nausea, vomiting, or abd pain. No difficulty swallowing .Lungs CTA b/l. RRR. Abd soft, non-tender. No calf swelling or tenderness. + distal pulses. Noted to have erythema to posterior pharynx with b/l tonsilar exudate. Uvula midline.

## 2019-01-03 NOTE — ED PROVIDER NOTE - ENMT, MLM
Airway patent, Nasal mucosa clear. Mouth with normal mucosa. Uvula midline. b/l tonsilar hypertrophy with b/l exudate.

## 2019-01-03 NOTE — ED PROVIDER NOTE - RESPIRATORY NEGATIVE STATEMENT, MLM
May 11, 2017        Maria C Yang, JENN  [address] [facility] [address]             [facility]  [address] [place] [address] [address]  [facility] [address]  Phone: [phone]   Patient: Balaji Langford   MR Number: [id number]   YOB: 1960   Date of Visit: 5/11/2017       Dear Dr. Yang:    Thank you for referring Balaji Langford to me for evaluation. Below are the relevant portions of my assessment and plan of care.            If you have questions, please do not hesitate to call me. I look forward to following Balaji Dinero along with you.    Sincerely,      Jay Stringer Jr., MD           CC  No Recipients        no chest pain, no cough, and no shortness of breath.

## 2019-03-04 ENCOUNTER — APPOINTMENT (OUTPATIENT)
Dept: VASCULAR SURGERY | Facility: CLINIC | Age: 34
End: 2019-03-04
Payer: MEDICAID

## 2019-03-04 VITALS
DIASTOLIC BLOOD PRESSURE: 69 MMHG | SYSTOLIC BLOOD PRESSURE: 93 MMHG | WEIGHT: 264 LBS | BODY MASS INDEX: 45.07 KG/M2 | HEIGHT: 64 IN | TEMPERATURE: 98.7 F | HEART RATE: 73 BPM

## 2019-03-04 PROCEDURE — 99213 OFFICE O/P EST LOW 20 MIN: CPT

## 2019-03-04 PROCEDURE — 93970 EXTREMITY STUDY: CPT

## 2019-03-04 NOTE — HISTORY OF PRESENT ILLNESS
[FreeTextEntry1] : I have just had the pleasure of seeing Mrs. Coleen Hagan ( [birth year]) in follow up for left lower extremity deep vein thrombosis.\par \par Mrs. Hagan is a pleasant 33-year-old lady with a history of two spontaneous abortions who presented three months ago with a left popliteal vein DVT that was diagnosed on ultrasonography at [facility] on 18. She developed left leg pain a few days prior, which prompted her to seek evaluation. She denied any prior history of travel, trauma, periods of immobility or other predisposing factors. She denied any prior history of DVT, SVT or PE. \par \par During her last visit, I instructed her to undergo evaluation by Hematology for a hypercoagulable workup, which she has not yet done. She finished a three-month course of Eliquis yesterday.\par \par She complains of achiness and heaviness in the left leg, which is improved by elevation. \par \par Medial history is otherwise significant for GERD and obesity (BMI 45)\par \par Medications: None\par \par Allergies: NKDA\par \par Social history: Non-smoker\par \par FH: NC\par \par

## 2019-03-04 NOTE — ASSESSMENT
[FreeTextEntry1] : In summary, Mrs. Hagan presents with a left leg DVT. I instructed her to follow up with Dr. Montgomery (Hematology) for a hypercoagulable workup. Contact information was provided. \par \par I renewed her Eliquis rx and instructed her to  her medication today and continue to take anticoagulation until evaluation by Hematology is completed. \par \par She will return to follow up in three months.

## 2019-03-04 NOTE — PHYSICAL EXAM
[de-identified] : On physical examination the patient is NAD. Neurologically intact. The lungs are clear to auscultation and the heart has a regular rate and rhythm. The abdomen is soft, without tenderness or pulsatile mass. Bilateral femoral and pedal pulses are palpable. \par \par A lower extremity venous duplex ultrasound was performed in the office today, which showed:\par -Right: No evidence of DVT/SVT \par -Left: occlusive acute-subacute DVT in the popliteal vein\par

## 2019-03-11 ENCOUNTER — APPOINTMENT (OUTPATIENT)
Dept: VASCULAR SURGERY | Facility: CLINIC | Age: 34
End: 2019-03-11

## 2019-03-28 NOTE — ED ADULT TRIAGE NOTE - LOCATION:
[facility] EMERGENCY DEPARTMENT ([facility])    3/28/19       History     Chief Complaint   Patient presents with     Back Pain     pain across lower back, no trauma, pain radiates down both legs for the past 3 days     The history is provided by the patient. A  was used (Official iPad [place] ).     Sharon Nur is a 36 year old female with a medical history significant for lumbar disc disease with radiculopathy and spinal stenosis of the lumbar region who presents to the Emergency Department for evaluation of lower back pain that radiates down her bilateral legs to her feet.  Patient reports she first had this pain in 2015; at that time, she was given medications that resolved the pain.  She is unable to remember the name of this medication.  She has not had this pain again until 3 days ago; the pain has been constant and worsening since that time.  The patient reports she was praying and bending over when the pain started.  She reports the pain is worse with movement, but especially worse with bending forward.  The patient denies any history of back surgeries or past back injuries.  She denies any loss of bowel or bladder control and denies any saddle anesthesia.  The patient has not taken any medications for her current pain.    I have reviewed the Medications, Allergies, Past Medical and Surgical History, and Social History in the [employer] system.    Past Medical History:   Diagnosis Date     Spinal stenosis        Past Surgical History:   Procedure Laterality Date     NO HISTORY OF SURGERY         History reviewed. No pertinent family history.    Social History     Tobacco Use     Smoking status: Never Smoker     Smokeless tobacco: Never Used   Substance Use Topics     Alcohol use: No     Alcohol/week: 0.0 oz       No current facility-administered medications for this encounter.      Current Outpatient Medications   Medication     cyclobenzaprine (FLEXERIL) 10 MG tablet      ibuprofen (ADVIL/MOTRIN) 600 MG tablet      No Known Allergies      Review of Systems   Constitutional: Negative for fever.   HENT: Negative for congestion.    Eyes: Negative for redness.   Respiratory: Negative for shortness of breath.    Cardiovascular: Negative for chest pain.   Gastrointestinal: Negative for abdominal pain.   Genitourinary: Negative for difficulty urinating.   Musculoskeletal: Positive for back pain (lower with radiation down bilateral legs). Negative for arthralgias and neck stiffness.   Skin: Negative for color change.   Neurological: Negative for headaches.        Negative for loss of bowel or bladder control  Negative for saddle anesthesia   Psychiatric/Behavioral: Negative for confusion.   All other systems reviewed and are negative.      Physical Exam   BP: 103/68  Pulse: 89  Temp: 97.8  F (36.6  C)  Resp: 16  Weight: 81 kg (178 lb 8 oz)  SpO2: 100 %      Physical Exam   Constitutional: No distress.   HENT:   Head: Atraumatic.   Mouth/Throat: Oropharynx is clear and moist. No oropharyngeal exudate.   Eyes: Pupils are equal, round, and reactive to light. No scleral icterus.   Cardiovascular: Normal heart sounds and intact distal pulses.   Pulmonary/Chest: Breath sounds normal. No respiratory distress.   Abdominal: Soft. Bowel sounds are normal. There is no tenderness.   Musculoskeletal: She exhibits no edema.        Lumbar back: She exhibits tenderness and pain. She exhibits normal range of motion.        Back:    Skin: Skin is warm. No rash noted. She is not diaphoretic.       ED Course   10:32 AM  The patient was seen and examined by Andry Bush MD in Room ED20.        Procedures                 Labs Ordered and Resulted from Time of ED Arrival Up to the Time of Departure from the ED - No data to display         Assessments & Plan (with Medical Decision Making)   36-year-old female presents with 3-day history of lower back pain with radiation into the legs.  Exam revealed tenderness over  the bilateral paraspinous musculature of her lumbosacral spine.  Motor and sensory exam of the lower extremities is entirely normal.  Patient was treated with Toradol IM with some relief.  She will be discharged with ibuprofen and Flexeril as well as using ice alternating with heat.  I recommended follow-up with primary physician in the next week.    I have reviewed the nursing notes.    I have reviewed the findings, diagnosis, plan and need for follow up with the patient.       Medication List      Started    cyclobenzaprine 10 MG tablet  Commonly known as:  FLEXERIL  10 mg, Oral, 3 TIMES DAILY PRN            Final diagnoses:   Acute bilateral low back pain with bilateral sciatica     IJona, am serving as a trained medical scribe to document services personally performed by Andry Bush MD, based on the provider's statements to me.   IAndry MD, was physically present and have reviewed and verified the accuracy of this note documented by Jona Nick.    3/28/2019   [facility], [place], EMERGENCY DEPARTMENT     Andry Bush MD  03/28/19 [id number]     Right arm;

## 2019-04-20 ENCOUNTER — EMERGENCY (EMERGENCY)
Facility: HOSPITAL | Age: 34
LOS: 1 days | Discharge: HOME | End: 2019-04-20
Attending: STUDENT IN AN ORGANIZED HEALTH CARE EDUCATION/TRAINING PROGRAM | Admitting: STUDENT IN AN ORGANIZED HEALTH CARE EDUCATION/TRAINING PROGRAM
Payer: MEDICAID

## 2019-04-20 VITALS
HEART RATE: 68 BPM | OXYGEN SATURATION: 100 % | TEMPERATURE: 98 F | RESPIRATION RATE: 16 BRPM | DIASTOLIC BLOOD PRESSURE: 61 MMHG | SYSTOLIC BLOOD PRESSURE: 110 MMHG

## 2019-04-20 VITALS — OXYGEN SATURATION: 100 % | RESPIRATION RATE: 16 BRPM

## 2019-04-20 LAB
ALBUMIN SERPL ELPH-MCNC: 3.4 G/DL — SIGNIFICANT CHANGE UP (ref 3.3–5)
ALP SERPL-CCNC: 69 U/L — SIGNIFICANT CHANGE UP (ref 40–120)
ALT FLD-CCNC: 7 U/L — SIGNIFICANT CHANGE UP (ref 4–33)
ANION GAP SERPL CALC-SCNC: 9 MMO/L — SIGNIFICANT CHANGE UP (ref 7–14)
APPEARANCE UR: CLEAR — SIGNIFICANT CHANGE UP
AST SERPL-CCNC: 14 U/L — SIGNIFICANT CHANGE UP (ref 4–32)
BACTERIA # UR AUTO: SIGNIFICANT CHANGE UP
BASOPHILS # BLD AUTO: 0.02 K/UL — SIGNIFICANT CHANGE UP (ref 0–0.2)
BASOPHILS NFR BLD AUTO: 0.5 % — SIGNIFICANT CHANGE UP (ref 0–2)
BILIRUB SERPL-MCNC: 0.3 MG/DL — SIGNIFICANT CHANGE UP (ref 0.2–1.2)
BILIRUB UR-MCNC: NEGATIVE — SIGNIFICANT CHANGE UP
BLOOD UR QL VISUAL: NEGATIVE — SIGNIFICANT CHANGE UP
BUN SERPL-MCNC: 12 MG/DL — SIGNIFICANT CHANGE UP (ref 7–23)
CALCIUM SERPL-MCNC: 8.7 MG/DL — SIGNIFICANT CHANGE UP (ref 8.4–10.5)
CHLORIDE SERPL-SCNC: 103 MMOL/L — SIGNIFICANT CHANGE UP (ref 98–107)
CO2 SERPL-SCNC: 25 MMOL/L — SIGNIFICANT CHANGE UP (ref 22–31)
COLOR SPEC: YELLOW — SIGNIFICANT CHANGE UP
CREAT SERPL-MCNC: 0.68 MG/DL — SIGNIFICANT CHANGE UP (ref 0.5–1.3)
EOSINOPHIL # BLD AUTO: 0.16 K/UL — SIGNIFICANT CHANGE UP (ref 0–0.5)
EOSINOPHIL NFR BLD AUTO: 3.8 % — SIGNIFICANT CHANGE UP (ref 0–6)
GLUCOSE SERPL-MCNC: 92 MG/DL — SIGNIFICANT CHANGE UP (ref 70–99)
GLUCOSE UR-MCNC: NEGATIVE — SIGNIFICANT CHANGE UP
HCT VFR BLD CALC: 34.1 % — LOW (ref 34.5–45)
HGB BLD-MCNC: 10.7 G/DL — LOW (ref 11.5–15.5)
HYALINE CASTS # UR AUTO: NEGATIVE — SIGNIFICANT CHANGE UP
IMM GRANULOCYTES NFR BLD AUTO: 0.2 % — SIGNIFICANT CHANGE UP (ref 0–1.5)
KETONES UR-MCNC: NEGATIVE — SIGNIFICANT CHANGE UP
LEUKOCYTE ESTERASE UR-ACNC: SIGNIFICANT CHANGE UP
LYMPHOCYTES # BLD AUTO: 1.56 K/UL — SIGNIFICANT CHANGE UP (ref 1–3.3)
LYMPHOCYTES # BLD AUTO: 36.6 % — SIGNIFICANT CHANGE UP (ref 13–44)
MCHC RBC-ENTMCNC: 27.5 PG — SIGNIFICANT CHANGE UP (ref 27–34)
MCHC RBC-ENTMCNC: 31.4 % — LOW (ref 32–36)
MCV RBC AUTO: 87.7 FL — SIGNIFICANT CHANGE UP (ref 80–100)
MONOCYTES # BLD AUTO: 0.54 K/UL — SIGNIFICANT CHANGE UP (ref 0–0.9)
MONOCYTES NFR BLD AUTO: 12.7 % — SIGNIFICANT CHANGE UP (ref 2–14)
NEUTROPHILS # BLD AUTO: 1.97 K/UL — SIGNIFICANT CHANGE UP (ref 1.8–7.4)
NEUTROPHILS NFR BLD AUTO: 46.2 % — SIGNIFICANT CHANGE UP (ref 43–77)
NITRITE UR-MCNC: NEGATIVE — SIGNIFICANT CHANGE UP
NRBC # FLD: 0 K/UL — SIGNIFICANT CHANGE UP (ref 0–0)
PH UR: 6 — SIGNIFICANT CHANGE UP (ref 5–8)
PLATELET # BLD AUTO: 255 K/UL — SIGNIFICANT CHANGE UP (ref 150–400)
PMV BLD: 11.1 FL — SIGNIFICANT CHANGE UP (ref 7–13)
POTASSIUM SERPL-MCNC: 4.5 MMOL/L — SIGNIFICANT CHANGE UP (ref 3.5–5.3)
POTASSIUM SERPL-SCNC: 4.5 MMOL/L — SIGNIFICANT CHANGE UP (ref 3.5–5.3)
PROT SERPL-MCNC: 8.2 G/DL — SIGNIFICANT CHANGE UP (ref 6–8.3)
PROT UR-MCNC: 20 — SIGNIFICANT CHANGE UP
RBC # BLD: 3.89 M/UL — SIGNIFICANT CHANGE UP (ref 3.8–5.2)
RBC # FLD: 14.3 % — SIGNIFICANT CHANGE UP (ref 10.3–14.5)
RBC CASTS # UR COMP ASSIST: SIGNIFICANT CHANGE UP (ref 0–?)
SODIUM SERPL-SCNC: 137 MMOL/L — SIGNIFICANT CHANGE UP (ref 135–145)
SP GR SPEC: 1.03 — SIGNIFICANT CHANGE UP (ref 1–1.04)
SQUAMOUS # UR AUTO: SIGNIFICANT CHANGE UP
UROBILINOGEN FLD QL: NORMAL — SIGNIFICANT CHANGE UP
WBC # BLD: 4.26 K/UL — SIGNIFICANT CHANGE UP (ref 3.8–10.5)
WBC # FLD AUTO: 4.26 K/UL — SIGNIFICANT CHANGE UP (ref 3.8–10.5)
WBC UR QL: SIGNIFICANT CHANGE UP (ref 0–?)

## 2019-04-20 PROCEDURE — 99283 EMERGENCY DEPT VISIT LOW MDM: CPT | Mod: 25

## 2019-04-20 RX ORDER — METRONIDAZOLE 500 MG
1 TABLET ORAL
Qty: 14 | Refills: 0
Start: 2019-04-20 | End: 2019-04-26

## 2019-04-20 RX ORDER — SODIUM CHLORIDE 9 MG/ML
1000 INJECTION INTRAMUSCULAR; INTRAVENOUS; SUBCUTANEOUS ONCE
Qty: 0 | Refills: 0 | Status: COMPLETED | OUTPATIENT
Start: 2019-04-20 | End: 2019-04-20

## 2019-04-20 RX ORDER — CEPHALEXIN 500 MG
1 CAPSULE ORAL
Qty: 14 | Refills: 0
Start: 2019-04-20 | End: 2019-04-26

## 2019-04-20 RX ADMIN — SODIUM CHLORIDE 1000 MILLILITER(S): 9 INJECTION INTRAMUSCULAR; INTRAVENOUS; SUBCUTANEOUS at 09:43

## 2019-04-20 NOTE — ED PROVIDER NOTE - ATTENDING CONTRIBUTION TO CARE
lightheaded, intermittent, worsened with exertion x few days  denies chest pain, palp, sob, abd pain, n/v  denies head, neuro    2 days dysuria, light brown vag disch - denies fevers/ abd pain    PE unremarkable    recent dvt - placed on eliquis, stopped on her own    EKG - NSR at 68, 158/78/402, no st changes

## 2019-04-20 NOTE — ED PROVIDER NOTE - OBJECTIVE STATEMENT
33 y/o F PMH DVT November 2018 (on eliquid but self-discontinued 2 weeks ago) presenting with 3-4 days of intermittent lightheadedness, exacerbated by exertion and movements, relieved by rest. Notes today stood up quickly at work and symptoms were severe, currently lightheadedness minimal at rest. Also associated with some blurry vision while lightheadedness is severe. No vision changes currently. She denies any fevers/chills, neck pain, HA, ear ringing, chest pain, SOB, palpitations, abd pain, n/v/d, or skin changes. Also reports 2 days of dysuria and light brown vaginal discharge. Denies any hematuria or vaginal pain. No OCPs, smoking, recent surgery or trauma.

## 2019-04-20 NOTE — ED PROVIDER NOTE - NSFOLLOWUPINSTRUCTIONS_ED_ALL_ED_FT
You were seen in the ER for lightheadedness. We did labwork that showed a possible urinary tract infection and on pelvic exam possible bacterial vaginosis. We will prescribe two antibiotics (keflex 500mg twice per day for 7 days and metronidazole 500mg twice per day for 7 days) to treat these infections. Your symptoms improved with IV fluids received in the ED. You will be discharged home and should drink plenty of fluids and take these antibiotics as prescribed. Follow-up with your primary doctor within 1 week for further evaluation and management of your symptoms. Return to the ER for any worsening lightheadedness/fainting, chest pain, difficulty breathing, abdominal pain, vomiting/inability to eat, fevers, or any other new or concerning symptoms. You were seen in the ER for lightheadedness. We did labwork that showed a possible urinary tract infection and on pelvic exam possible bacterial vaginosis. We will prescribe two antibiotics (keflex 500mg twice per day for 7 days and metronidazole 500mg twice per day for 7 days) to treat these infections. Your symptoms improved with IV fluids received in the ED. You will be discharged home and should drink plenty of fluids and take these antibiotics as prescribed. Follow-up with your primary doctor within 1 week for further evaluation and management of your symptoms. Please continue to take your eliquis as prescribed until you follow-up at your hematologist appointment in 1 month for further evaluation and discussion if you need to continue taking this medication. Return to the ER for any worsening lightheadedness/fainting, chest pain, difficulty breathing, abdominal pain, vomiting/inability to eat, fevers, or any other new or concerning symptoms.

## 2019-04-20 NOTE — ED PROVIDER NOTE - PHYSICAL EXAMINATION
PHYSICAL EXAM:  GENERAL: Sitting comfortable in bed, in no acute distress  HENMT: Atraumatic, moist mucous membranes, no oropharyngeal exudates or vesicles, uvula is midline  EYES: Clear bilaterally, PERRL, EOMs intact b/l  HEART: RRR, S1/S2, (+) soft systolic murmur  RESPIRATORY: Clear to auscultation bilaterally, no wheezes/rhonchi/rales  ABDOMEN: +BS, soft, nontender, nondistended  EXTREMITIES: No lower extremity edema, +2 radial pulses b/l  NEURO:  A&Ox4, no focal motor deficits or sensory deficits   Heme/LYMPH: No ecchymosis or bruising, no anterior/posterior cervical or supraclavicular LAD  SKIN:  Skin normal color for race, warm, dry and intact. No evidence of rash.

## 2019-04-20 NOTE — ED PROVIDER NOTE - PROGRESS NOTE DETAILS
Fama Resident MD: Pt not orthostatic. Symptoms improved with IVF. Vital signs stable. Ambulating without lightheadedness. Labwork no emergent findings. UA showing possible early UTI, will treat given patient's symptoms. Pelvic Exam ([facility] ED david Xiong) showing normal external genitalia, no bleeding note in endovaginal canal, cervix closed with no lesions noted, milky white discharge present with fishy odor. Will also treat bacterial vaginosis. Plan to d/c home with return precautions.    Increase Topomax to twice daily.    Return to ER for any new or worsening symptoms, weakness, numbness, slurred speech, dizziness or any other concerns.

## 2019-04-20 NOTE — ED ADULT NURSE NOTE - CHIEF COMPLAINT QUOTE
Pt walk in c/o Dizziness/lightheaded for 3 days, worse today. Denies HA CP SOB palpitation N V sweating PMhx on Eliquis for Blood clots on left leg

## 2019-04-20 NOTE — ED PROVIDER NOTE - CLINICAL SUMMARY MEDICAL DECISION MAKING FREE TEXT BOX
5 y/o F PMH DVT November 2018 (on eliquid but self-discontinued 2 weeks ago) presenting with 3-4 days of intermittent lightheadedness. Also 2 days of dysuria and vaginal discharge. Vital signs stable. Benign physical exam. EKG shows NSR at 68 bpm with no ST/T wave changes. Will obtain UA, UCG. 35 y/o F PMH DVT November 2018 (on eliquid but self-discontinued 2 weeks ago) presenting with 3-4 days of intermittent lightheadedness. Also 2 days of dysuria and vaginal discharge. Vital signs stable. Benign physical exam. EKG shows NSR at 68 bpm with no ST/T wave changes. Will obtain orthostatics, UA, UCG, GC/chlamydia, basic labs and proceed with IV fluid hyration and reevaluate.

## 2019-04-20 NOTE — ED PROVIDER NOTE - NS ED ROS FT
Constitutional: (+) lightheadedness, no fever and no chills  Eyes: no discharge, no irritation, no pain, no visual changes  ENMT: no ear pain or hearing loss, no dysphagia or throat pain  Neck: no pain, no stiffness, no swollen glands  CV: no chest pain, no palpitations, no edema  Resp: no cough, no shortness of breath  Abd: no abdominal pain, no nausea or vomiting, no diarrhea  : (+) dysuria, (+) vaginal discharge, no hematuria  MSK: no back pain, no neck pain, no joint pain  Neuro: no LOC, no gait abnormality, no headache, no sensory deficits, no weakness  Skin: no rashes, no lacerations, no lesions

## 2019-04-20 NOTE — ED ADULT NURSE REASSESSMENT NOTE - NS ED NURSE REASSESS COMMENT FT1
pt alert,oriented x3. denies c/o pain reports dizziness,denies n,v. iv access,labs sent. will continue to monitor. md melendez at present

## 2019-04-20 NOTE — ED ADULT NURSE NOTE - OBJECTIVE STATEMENT
Pt ambulated to intake room 5 a/o x 3 c/o intermittent dizziness and dysuria x 3 days. pt states when she woke up this morning she felt really lightheaded like she was going to passes out, but didn't. Pt also states she has been having brownish discharge. Pt has hx of blood clots to left leg on eliquis but pt hasn't taken blood thinners for 2 weeks because she felt like she didn't need it. No complaints of chest pain, headache, nausea, dizziness, vomiting  SOB, fever, chills verbalized.

## 2019-04-21 LAB
BACTERIA UR CULT: SIGNIFICANT CHANGE UP
SPECIMEN SOURCE: SIGNIFICANT CHANGE UP

## 2019-04-22 LAB
C TRACH RRNA SPEC QL NAA+PROBE: SIGNIFICANT CHANGE UP
N GONORRHOEA RRNA SPEC QL NAA+PROBE: SIGNIFICANT CHANGE UP
SPECIMEN SOURCE: SIGNIFICANT CHANGE UP

## 2019-04-25 ENCOUNTER — EMERGENCY (EMERGENCY)
Facility: HOSPITAL | Age: 34
LOS: 1 days | Discharge: HOME | End: 2019-04-25
Attending: EMERGENCY MEDICINE | Admitting: EMERGENCY MEDICINE
Payer: MEDICAID

## 2019-04-25 VITALS
HEART RATE: 66 BPM | DIASTOLIC BLOOD PRESSURE: 77 MMHG | OXYGEN SATURATION: 100 % | SYSTOLIC BLOOD PRESSURE: 111 MMHG | RESPIRATION RATE: 14 BRPM

## 2019-04-25 PROCEDURE — 71046 X-RAY EXAM CHEST 2 VIEWS: CPT | Mod: 26

## 2019-04-25 PROCEDURE — 99284 EMERGENCY DEPT VISIT MOD MDM: CPT

## 2019-04-25 RX ORDER — ACETAMINOPHEN 500 MG
650 TABLET ORAL ONCE
Qty: 0 | Refills: 0 | Status: COMPLETED | OUTPATIENT
Start: 2019-04-25 | End: 2019-04-25

## 2019-04-25 RX ORDER — IBUPROFEN 200 MG
600 TABLET ORAL ONCE
Qty: 0 | Refills: 0 | Status: COMPLETED | OUTPATIENT
Start: 2019-04-25 | End: 2019-04-25

## 2019-04-25 RX ORDER — APIXABAN 2.5 MG/1
5 TABLET, FILM COATED ORAL ONCE
Qty: 0 | Refills: 0 | Status: COMPLETED | OUTPATIENT
Start: 2019-04-25 | End: 2019-04-25

## 2019-04-25 RX ADMIN — Medication 600 MILLIGRAM(S): at 17:42

## 2019-04-25 RX ADMIN — Medication 650 MILLIGRAM(S): at 17:42

## 2019-04-25 RX ADMIN — APIXABAN 5 MILLIGRAM(S): 2.5 TABLET, FILM COATED ORAL at 19:18

## 2019-04-25 NOTE — ED PROVIDER NOTE - PHYSICAL EXAMINATION
Vitals: WNL  Gen: laying comfortably in NAD  Head: NCAT  ENT: sclerae white, anicterus, moist mucous membranes. No exudates. Neck supple, no LAD,  no carotid bruits, no JVD  CV: RRR. Audible S1 and S2. No murmurs, rubs, gallops, S3, nor S4, 2+ radial and DP pulses   Pulm: Clear to auscultation bilaterally. No wheezes, rales, or rhonchi  Abd: soft, normoactive BS x4, NTND, no rebound, no guarding, no rashes  Musculoskeletal:  R chest wall ttp  Skin: no lesions or scars noted  Neurologic: AAOx3  : no CVA tenderness

## 2019-04-25 NOTE — ED PROVIDER NOTE - OBJECTIVE STATEMENT
35yo F h/o RLE DVT on eliquist p/w R chest pain for several days, intermittent. pain spontaneously occurs, not worse with exertion, worse with movement of the arm over the head. pain upon touching the area. hasn't take anything for the pain. reports she moves denies f/c, cough, sob.

## 2019-04-25 NOTE — ED ADULT TRIAGE NOTE - CHIEF COMPLAINT QUOTE
pt c/o rt sided intermittent chest pain x 2 days, worse when bending down, PMH: lft DVT-on Eliquis, currently on 2 abx for UTI and vaginal infection

## 2019-04-25 NOTE — ED PROVIDER NOTE - NS ED ROS FT
Constitutional: no fevers, chills  HEENT: no visual changes, no sore throat, no rhinorrhea  CV: +R cp  Resp: no sob  GI: no abd pain, n/v, diarrhea/constipation  : no dysuria, hematuria  MSK: no joint pains  skin: no rashes  neuro: no HA, no confusion  psych: no SI/HI  heme: no LAD

## 2019-04-25 NOTE — ED PROVIDER NOTE - ATTENDING CONTRIBUTION TO CARE
Seen and examined, states episodic, mid sternal and R upper chest, no assoc. with walking, no SOB or WILHELM, no N/V/diaphoresis. Pt. has hx of DVT dx 11/2018 and placed on Eliquis. RLE sx improved and when returned for visit at 3 months. At that time was extended to 6mo. of Eliquis and has FU appt. with heme and PMD. Now states stopped meds 2 wks. ago, denies side effects but was disappointed to take more than 3 mo. No new leg swelling, no pleuritic sx, ambulates easily.  At time of exam, pt. denies CP and has no reproducible sx. Clear lungs, NT CW, heart reg, abd soft, NT to palp, NT calves, equal girth.

## 2019-04-26 PROBLEM — I82.409 ACUTE EMBOLISM AND THROMBOSIS OF UNSPECIFIED DEEP VEINS OF UNSPECIFIED LOWER EXTREMITY: Chronic | Status: ACTIVE | Noted: 2019-04-20

## 2019-05-01 NOTE — ED ADULT NURSE NOTE - PRIMARY CARE PROVIDER
does not know md name

## 2019-05-06 ENCOUNTER — APPOINTMENT (OUTPATIENT)
Dept: RHEUMATOLOGY | Facility: CLINIC | Age: 34
End: 2019-05-06
Payer: MEDICAID

## 2019-05-06 VITALS
BODY MASS INDEX: 45.07 KG/M2 | WEIGHT: 264 LBS | OXYGEN SATURATION: 98 % | HEIGHT: 64 IN | TEMPERATURE: 98.5 F | RESPIRATION RATE: 14 BRPM

## 2019-05-06 PROCEDURE — 99204 OFFICE O/P NEW MOD 45 MIN: CPT

## 2019-05-06 NOTE — PATIENT PROFILE OB - WEIGHT PRESENT IN KG
Prior to injection, verified patient identity using patient's name and date of birth.      BP: Data Unavailable    LAST PAP/EXAM: No results found for: PAP  URINE HCG:not indicated    NEXT INJECTION DUE: 7/22/19 - 8/5/19         Drug Amount Wasted:  None.  Vial/Syringe: Single dose vial  Expiration Date:  07/2021    OH CHAUHAN       108

## 2019-06-10 ENCOUNTER — APPOINTMENT (OUTPATIENT)
Dept: VASCULAR SURGERY | Facility: CLINIC | Age: 34
End: 2019-06-10
Payer: MEDICAID

## 2019-06-10 VITALS
TEMPERATURE: 98.7 F | DIASTOLIC BLOOD PRESSURE: 68 MMHG | WEIGHT: 260 LBS | HEIGHT: 64 IN | SYSTOLIC BLOOD PRESSURE: 105 MMHG | BODY MASS INDEX: 44.39 KG/M2 | HEART RATE: 69 BPM

## 2019-06-10 PROCEDURE — 99213 OFFICE O/P EST LOW 20 MIN: CPT

## 2019-06-10 PROCEDURE — 93970 EXTREMITY STUDY: CPT

## 2019-06-10 NOTE — ASSESSMENT
[FreeTextEntry1] : In summary, Mrs. Hagan presents with a history of left popliteal vein DVT. The vein has now recanalized. I will contact Dr. Montgomery to determine whether there is any further indication for anticoagulation.

## 2019-06-10 NOTE — PHYSICAL EXAM
[de-identified] : On physical examination the patient is NAD. Neurologically intact. The lungs are clear to auscultation and the heart has a regular rate and rhythm. The abdomen is soft, without tenderness or pulsatile mass. Bilateral femoral and pedal pulses are palpable. \par \par A lower extremity venous duplex ultrasound was performed in the office today, which showed:\par -Right: No evidence of DVT/SVT \par -Left: chronic, non-occlusive DVT in the popliteal vein\par

## 2019-06-10 NOTE — HISTORY OF PRESENT ILLNESS
[FreeTextEntry1] : I have just had the pleasure of seeing Mrs. Coleen Hagan ( [birth year]) in follow up for left lower extremity deep vein thrombosis.\par \par Mrs. Hagan is a pleasant 34-year-old lady with a history of two spontaneous abortions who previously presented with a left popliteal vein DVT that was diagnosed on ultrasonography at [facility] on 18. She developed left leg pain a few days prior, which prompted her to seek evaluation. She denied any prior history of travel, trauma, periods of immobility or other predisposing factors. She denied any prior history of DVT, SVT or PE. \par \par During her last visit, I instructed her to undergo evaluation by Hematology for a hypercoagulable workup. I instructed her to take Eliquis until the workup was completed. She is without any complaints today.\par \par Medial history is otherwise significant for GERD and obesity (BMI 45)\par \par Medications: None\par \par Allergies: NKDA\par \par Social history: Non-smoker\par \par FH: NC\par \par

## 2019-06-10 NOTE — ADDENDUM
[FreeTextEntry1] : Dr. Chaidez recommends further Hematologic evaluation prior to stopping anticoagulation. Patient referred to Dr. Brown (Heme).\par Eliquis renewed.

## 2019-07-22 LAB
ANA PAT FLD IF-IMP: ABNORMAL
ANA SER IF-ACNC: ABNORMAL
APPEARANCE: CLEAR
APTT BLD: 32.3 SEC
B2 GLYCOPROT1 AB SER QL: NEGATIVE
BACTERIA: ABNORMAL
BILIRUBIN URINE: NEGATIVE
BLOOD URINE: NEGATIVE
CARDIOLIPIN AB SER IA-ACNC: NEGATIVE
CENTROMERE IGG SER-ACNC: <0.2 CD:130001892
COLOR: YELLOW
CONFIRM: 28.4 SEC
CREAT SPEC-SCNC: 248 MG/DL
CREAT/PROT UR: 0.1 RATIO
DRVVT IMM 1:2 NP PPP: NORMAL
DRVVT SCREEN TO CONFIRM RATIO: 0.98 RATIO
DSDNA AB SER-ACNC: 12 IU/ML
ENA RNP AB SER IA-ACNC: 0.2 AL
ENA SCL70 IGG SER IA-ACNC: <0.2 AL
ENA SM AB SER IA-ACNC: <0.2 AL
ENA SS-A AB SER IA-ACNC: >8 AL
ENA SS-B AB SER IA-ACNC: <0.2 AL
GLUCOSE QUALITATIVE U: NEGATIVE
HYALINE CASTS: 1 /LPF
KETONES URINE: NEGATIVE
LEUKOCYTE ESTERASE URINE: NEGATIVE
MICROSCOPIC-UA: NORMAL
NITRITE URINE: NEGATIVE
PH URINE: 6
PROT UR-MCNC: 19 MG/DL
PROTEIN URINE: NORMAL
RED BLOOD CELLS URINE: 2 /HPF
RNA POLYMERASE III IGG: 36.1 U
SCREEN DRVVT: 34.7 SEC
SILICA CLOTTING TIME INTERPRETATION: NORMAL
SILICA CLOTTING TIME S/C: 0.87 RATIO
SPECIFIC GRAVITY URINE: 1.03
SQUAMOUS EPITHELIAL CELLS: 9 /HPF
THYROGLOB AB SERPL-ACNC: <20 IU/ML
THYROPEROXIDASE AB SERPL IA-ACNC: 19.9 IU/ML
URINE COMMENTS: NORMAL
UROBILINOGEN URINE: NORMAL
WHITE BLOOD CELLS URINE: 11 /HPF

## 2019-07-29 ENCOUNTER — APPOINTMENT (OUTPATIENT)
Dept: RHEUMATOLOGY | Facility: CLINIC | Age: 34
End: 2019-07-29

## 2019-10-15 ENCOUNTER — EMERGENCY (EMERGENCY)
Facility: HOSPITAL | Age: 34
LOS: 1 days | Discharge: HOME | End: 2019-10-15
Attending: EMERGENCY MEDICINE | Admitting: EMERGENCY MEDICINE
Payer: MEDICAID

## 2019-10-15 VITALS
OXYGEN SATURATION: 100 % | DIASTOLIC BLOOD PRESSURE: 77 MMHG | TEMPERATURE: 98 F | HEART RATE: 75 BPM | SYSTOLIC BLOOD PRESSURE: 127 MMHG | RESPIRATION RATE: 16 BRPM

## 2019-10-15 VITALS
RESPIRATION RATE: 18 BRPM | HEART RATE: 66 BPM | OXYGEN SATURATION: 100 % | TEMPERATURE: 98 F | DIASTOLIC BLOOD PRESSURE: 75 MMHG | SYSTOLIC BLOOD PRESSURE: 132 MMHG

## 2019-10-15 LAB
ALBUMIN SERPL ELPH-MCNC: 3.6 G/DL — SIGNIFICANT CHANGE UP (ref 3.3–5)
ALP SERPL-CCNC: 73 U/L — SIGNIFICANT CHANGE UP (ref 40–120)
ALT FLD-CCNC: 9 U/L — SIGNIFICANT CHANGE UP (ref 4–33)
ANION GAP SERPL CALC-SCNC: 11 MMO/L — SIGNIFICANT CHANGE UP (ref 7–14)
APPEARANCE UR: CLEAR — SIGNIFICANT CHANGE UP
AST SERPL-CCNC: 15 U/L — SIGNIFICANT CHANGE UP (ref 4–32)
BASOPHILS # BLD AUTO: 0.02 K/UL — SIGNIFICANT CHANGE UP (ref 0–0.2)
BASOPHILS NFR BLD AUTO: 0.4 % — SIGNIFICANT CHANGE UP (ref 0–2)
BILIRUB SERPL-MCNC: 0.2 MG/DL — SIGNIFICANT CHANGE UP (ref 0.2–1.2)
BILIRUB UR-MCNC: NEGATIVE — SIGNIFICANT CHANGE UP
BLOOD UR QL VISUAL: NEGATIVE — SIGNIFICANT CHANGE UP
BUN SERPL-MCNC: 13 MG/DL — SIGNIFICANT CHANGE UP (ref 7–23)
CALCIUM SERPL-MCNC: 8.8 MG/DL — SIGNIFICANT CHANGE UP (ref 8.4–10.5)
CHLORIDE SERPL-SCNC: 102 MMOL/L — SIGNIFICANT CHANGE UP (ref 98–107)
CO2 SERPL-SCNC: 24 MMOL/L — SIGNIFICANT CHANGE UP (ref 22–31)
COLOR SPEC: COLORLESS — SIGNIFICANT CHANGE UP
CREAT SERPL-MCNC: 0.65 MG/DL — SIGNIFICANT CHANGE UP (ref 0.5–1.3)
EOSINOPHIL # BLD AUTO: 0.05 K/UL — SIGNIFICANT CHANGE UP (ref 0–0.5)
EOSINOPHIL NFR BLD AUTO: 0.9 % — SIGNIFICANT CHANGE UP (ref 0–6)
GLUCOSE SERPL-MCNC: 114 MG/DL — HIGH (ref 70–99)
GLUCOSE UR-MCNC: NEGATIVE — SIGNIFICANT CHANGE UP
HCT VFR BLD CALC: 33.5 % — LOW (ref 34.5–45)
HGB BLD-MCNC: 10.8 G/DL — LOW (ref 11.5–15.5)
IMM GRANULOCYTES NFR BLD AUTO: 0.4 % — SIGNIFICANT CHANGE UP (ref 0–1.5)
KETONES UR-MCNC: NEGATIVE — SIGNIFICANT CHANGE UP
LEUKOCYTE ESTERASE UR-ACNC: SIGNIFICANT CHANGE UP
LIDOCAIN IGE QN: 20 U/L — SIGNIFICANT CHANGE UP (ref 7–60)
LYMPHOCYTES # BLD AUTO: 1.16 K/UL — SIGNIFICANT CHANGE UP (ref 1–3.3)
LYMPHOCYTES # BLD AUTO: 21.1 % — SIGNIFICANT CHANGE UP (ref 13–44)
MAGNESIUM SERPL-MCNC: 1.9 MG/DL — SIGNIFICANT CHANGE UP (ref 1.6–2.6)
MCHC RBC-ENTMCNC: 27.7 PG — SIGNIFICANT CHANGE UP (ref 27–34)
MCHC RBC-ENTMCNC: 32.2 % — SIGNIFICANT CHANGE UP (ref 32–36)
MCV RBC AUTO: 85.9 FL — SIGNIFICANT CHANGE UP (ref 80–100)
MONOCYTES # BLD AUTO: 0.32 K/UL — SIGNIFICANT CHANGE UP (ref 0–0.9)
MONOCYTES NFR BLD AUTO: 5.8 % — SIGNIFICANT CHANGE UP (ref 2–14)
NEUTROPHILS # BLD AUTO: 3.94 K/UL — SIGNIFICANT CHANGE UP (ref 1.8–7.4)
NEUTROPHILS NFR BLD AUTO: 71.4 % — SIGNIFICANT CHANGE UP (ref 43–77)
NITRITE UR-MCNC: NEGATIVE — SIGNIFICANT CHANGE UP
NRBC # FLD: 0 K/UL — SIGNIFICANT CHANGE UP (ref 0–0)
PH UR: 7 — SIGNIFICANT CHANGE UP (ref 5–8)
PHOSPHATE SERPL-MCNC: 3.3 MG/DL — SIGNIFICANT CHANGE UP (ref 2.5–4.5)
PLATELET # BLD AUTO: 267 K/UL — SIGNIFICANT CHANGE UP (ref 150–400)
PMV BLD: 11.6 FL — SIGNIFICANT CHANGE UP (ref 7–13)
POTASSIUM SERPL-MCNC: 4.4 MMOL/L — SIGNIFICANT CHANGE UP (ref 3.5–5.3)
POTASSIUM SERPL-SCNC: 4.4 MMOL/L — SIGNIFICANT CHANGE UP (ref 3.5–5.3)
PROT SERPL-MCNC: 8.3 G/DL — SIGNIFICANT CHANGE UP (ref 6–8.3)
PROT UR-MCNC: 10 — SIGNIFICANT CHANGE UP
RBC # BLD: 3.9 M/UL — SIGNIFICANT CHANGE UP (ref 3.8–5.2)
RBC # FLD: 14.6 % — HIGH (ref 10.3–14.5)
SODIUM SERPL-SCNC: 137 MMOL/L — SIGNIFICANT CHANGE UP (ref 135–145)
SP GR SPEC: 1.01 — SIGNIFICANT CHANGE UP (ref 1–1.04)
SQUAMOUS # UR AUTO: SIGNIFICANT CHANGE UP
UROBILINOGEN FLD QL: NORMAL — SIGNIFICANT CHANGE UP
WBC # BLD: 5.51 K/UL — SIGNIFICANT CHANGE UP (ref 3.8–10.5)
WBC # FLD AUTO: 5.51 K/UL — SIGNIFICANT CHANGE UP (ref 3.8–10.5)
WBC UR QL: SIGNIFICANT CHANGE UP (ref 0–?)

## 2019-10-15 PROCEDURE — 76705 ECHO EXAM OF ABDOMEN: CPT | Mod: 26

## 2019-10-15 PROCEDURE — 99285 EMERGENCY DEPT VISIT HI MDM: CPT

## 2019-10-15 RX ORDER — SODIUM CHLORIDE 9 MG/ML
1000 INJECTION, SOLUTION INTRAVENOUS ONCE
Refills: 0 | Status: COMPLETED | OUTPATIENT
Start: 2019-10-15 | End: 2019-10-15

## 2019-10-15 RX ORDER — ONDANSETRON 8 MG/1
4 TABLET, FILM COATED ORAL ONCE
Refills: 0 | Status: COMPLETED | OUTPATIENT
Start: 2019-10-15 | End: 2019-10-15

## 2019-10-15 RX ORDER — FAMOTIDINE 10 MG/ML
20 INJECTION INTRAVENOUS ONCE
Refills: 0 | Status: COMPLETED | OUTPATIENT
Start: 2019-10-15 | End: 2019-10-15

## 2019-10-15 RX ORDER — MORPHINE SULFATE 50 MG/1
4 CAPSULE, EXTENDED RELEASE ORAL ONCE
Refills: 0 | Status: DISCONTINUED | OUTPATIENT
Start: 2019-10-15 | End: 2019-10-15

## 2019-10-15 RX ADMIN — ONDANSETRON 4 MILLIGRAM(S): 8 TABLET, FILM COATED ORAL at 04:16

## 2019-10-15 RX ADMIN — MORPHINE SULFATE 4 MILLIGRAM(S): 50 CAPSULE, EXTENDED RELEASE ORAL at 05:37

## 2019-10-15 RX ADMIN — FAMOTIDINE 104 MILLIGRAM(S): 10 INJECTION INTRAVENOUS at 04:16

## 2019-10-15 RX ADMIN — SODIUM CHLORIDE 1000 MILLILITER(S): 9 INJECTION, SOLUTION INTRAVENOUS at 04:17

## 2019-10-15 RX ADMIN — MORPHINE SULFATE 4 MILLIGRAM(S): 50 CAPSULE, EXTENDED RELEASE ORAL at 04:16

## 2019-10-15 NOTE — ED PROVIDER NOTE - PROGRESS NOTE DETAILS
Abd US showed cholelithiasis w/o cholecystitis or CBD dilatation. Labs wnl. Patient symptoms improved after IVF, pain med. Currently non-tender to palpation. Will d/c if patient able to tolerate PO intake. Delbert KAY: Pt feeling better, pain resolved and she is tolerating PO.  Labs and US reviewed with patient - gallstone, no cholecystitis, labs wnl.  Pt is stable for dc and outpatient follow-up.

## 2019-10-15 NOTE — ED PROVIDER NOTE - PHYSICAL EXAMINATION
GENERAL: NAD, well-developed, morbidly obese  HEAD:  Atraumatic, Normocephalic  EYES: EOMI, PERRLA, conjunctiva and sclera clear  NECK: Supple, No JVD  CHEST/LUNG: Clear to auscultation bilaterally; No wheeze  HEART: Regular rate and rhythm; No murmurs, rubs, or gallops  ABDOMEN: Soft, TTP on RUQ, unable to perform Gomez's sign given patient in severe pain, Nondistended; Bowel sounds present  EXTREMITIES:  2+ Peripheral Pulses, No clubbing, cyanosis, or edema  PSYCH: AAOx3, appropriate mood and affect  NEUROLOGY: non-focal, no sensory or motor deficit.   SKIN: No rashes or lesions

## 2019-10-15 NOTE — ED ADULT TRIAGE NOTE - CHIEF COMPLAINT QUOTE
pt. c/o abd pain x 5 hours, reports eating porridge at about 9pm and developing the pain shortly thereafter. Pt. reports nausea, states she forced herself to vomit once, denies diarrhea. PMHx DVT.

## 2019-10-15 NOTE — ED PROVIDER NOTE - CLINICAL SUMMARY MEDICAL DECISION MAKING FREE TEXT BOX
35 yo female with morbid obesity, hx of left leg DVT (off AC) present with sudden onset epigastric abdominal pain. Exam with TTP mostly on RUQ, Ddx includes cholecystitis vs gastritis/PUD vs food poisoning. WIll check labs, UA, abdominal US. IVF, and pain control.

## 2019-10-15 NOTE — ED PROVIDER NOTE - NSFOLLOWUPINSTRUCTIONS_ED_ALL_ED_FT
You came in with abdominal pain after eating. Ultrasound of your abdomen showed gallstone in your gallbladder. You likely had transient pain due to gallbladder obstructing the bile duct. Please follow up with surgery in 1-2 weeks to discuss about option for possible removal of your gallbladder. Please consume low fat, low cholesterol diet.

## 2019-10-15 NOTE — ED PROVIDER NOTE - OBJECTIVE STATEMENT
33 yo female with morbid obesity, hx of left leg DVT (off AC) present with sudden onset epigastric abdominal pain. She states that pain started 5 hours ago, after she ate porridge. Described the pain as sharp, on epigastric region, radiating to bilateral back, 10/10, a/w 1 episode of nausea/vomiting. No fever, chill, CP, SOB, urinary symptoms. Never had this type of pain before. No NSAIDs use

## 2019-10-15 NOTE — ED PROVIDER NOTE - NS ED ROS FT
CONSTITUTIONAL: No weakness, fevers or chills  EYES/ENT: No visual changes;  No vertigo or throat pain   NECK: No pain or stiffness  RESPIRATORY: No cough, wheezing; No shortness of breath  CARDIOVASCULAR: No chest pain or palpitations  GASTROINTESTINAL: + abdominal pain. + nausea, vomiting; No diarrhea or constipation. No melena or hematochezia.  GENITOURINARY: No dysuria, frequency or hematuria  NEUROLOGICAL: No numbness or weakness  SKIN: No itching, rashes  MSK: no back pain, no joint pain

## 2019-10-15 NOTE — ED PROVIDER NOTE - PLAN OF CARE
Please follow up with your primary care physician in 1-2 weeks. Please follow up with surgery in 1-2 weeks.

## 2019-10-15 NOTE — ED PROVIDER NOTE - ATTENDING CONTRIBUTION TO CARE
35 y/o morbidly obese F with h/o prior DVT (no longer on AC) here with epigastric abd pain.  Pt reports around 9pm after eating porridge she developed severe upper abdominal pain R>L, associated with nausea.  She attempted to induce vomiting, which did not help.  No fever, vomiting, diarrhea, back pain, urinary sxs.  LMP 1 week ago.  No ETOH use tonight.  Pt reports similar pain a couple years ago and was told she had acid reflux at the time.  She was started on a medication (does not remember name) 33 y/o morbidly obese F with h/o prior DVT (no longer on AC) here with epigastric abd pain.  Pt reports around 9pm after eating porridge she developed severe upper abdominal pain R>L, associated with nausea.  She attempted to induce vomiting, which did not help.  No fever, vomiting, diarrhea, back pain, urinary sxs.  LMP 1 week ago.  No ETOH use tonight.  Pt reports similar pain a couple years ago and was told she had acid reflux at the time.  She was started on a medication (does not remember name) at the time, but no longer takes it.  Pt lying in stretcher, awake and alert, nontoxic.  AF/VSS.  Lungs cta bl.  Cards nl S1/S2, RRR, no MRG.  Abd soft obese with RUQ tenderness, pos peters's sign, rest of abd is ntnd.  No CVA tenderness.  Plan for labs, ucg, ruq sono, pain meds, ivfs - exam concerning for biliary disease, also consider pancreatitis vs gastritis, will reassess after meds.

## 2019-10-15 NOTE — ED ADULT NURSE NOTE - OBJECTIVE STATEMENT
33 yo F AAOx4 received to room 23 c/o abd pain, sudden onset after eating something for dinner ~ 2100, +n/v, denies diarrhea, abd soft, nontender, nondistended, denies any GI  changes, 20G placed to RAC, blood work sent and pt medicated, awaiting US at this time, will monitor

## 2019-10-15 NOTE — ED PROVIDER NOTE - PATIENT PORTAL LINK FT
You can access the FollowMyHealth Patient Portal offered by [facility] by registering at the following website: [email or web address]. By joining [facility]’s FollowMyHealth portal, you will also be able to view your health information using other applications (apps) compatible with our system.

## 2019-10-15 NOTE — ED PROVIDER NOTE - CARE PLAN
Principal Discharge DX:	Cholelithiasis  Assessment and plan of treatment:	Please follow up with your primary care physician in 1-2 weeks. Please follow up with surgery in 1-2 weeks.

## 2019-10-28 ENCOUNTER — APPOINTMENT (OUTPATIENT)
Dept: SURGERY | Facility: CLINIC | Age: 34
End: 2019-10-28
Payer: MEDICAID

## 2019-10-28 VITALS — HEART RATE: 75 BPM | DIASTOLIC BLOOD PRESSURE: 78 MMHG | SYSTOLIC BLOOD PRESSURE: 114 MMHG

## 2019-10-28 VITALS — TEMPERATURE: 98.5 F | HEIGHT: 64 IN | BODY MASS INDEX: 45.24 KG/M2 | WEIGHT: 265 LBS

## 2019-10-28 DIAGNOSIS — K80.20 CALCULUS OF GALLBLADDER W/OUT CHOLECYSTITIS W/OUT OBSTRUCTION: ICD-10-CM

## 2019-10-28 PROCEDURE — 99203 OFFICE O/P NEW LOW 30 MIN: CPT

## 2019-10-28 NOTE — HISTORY OF PRESENT ILLNESS
[de-identified] : 33 yo obese female with h/o DVT who should be on Eliquis but admits to not taking the medication presents for evaluation of an episode of RUQ pain, Sonogram done this month revealed gallstones without evidence of cholelithiasis. She has seen vascular surgery in the past and was told to see Hematology for a workup and never scheduled an appointment.

## 2019-10-28 NOTE — ASSESSMENT
[FreeTextEntry1] : 33 yo female with DVT, stopped taking her Eliquis. Now with symptomatic cholelithiasis. \par I explained to patient that she should resume her Eliquis and make an appointment with Hematology (number given to patient) She should then f/u with Vascular and once she is cleared we would discuss elective lap marvin.

## 2019-10-28 NOTE — PHYSICAL EXAM
[Respiratory Effort] : normal respiratory effort [Alert] : alert [Oriented to Person] : oriented to person [Oriented to Place] : oriented to place [Oriented to Time] : oriented to time [Calm] : calm [JVD] : no jugular venous distention  [de-identified] : ROSSI LEWIS NAD [de-identified] : EOMI [de-identified] : obese, Soft NT ND

## 2019-10-31 ENCOUNTER — OUTPATIENT (OUTPATIENT)
Dept: OUTPATIENT SERVICES | Facility: HOSPITAL | Age: 34
LOS: 1 days | Discharge: HOME | End: 2019-10-31

## 2019-10-31 DIAGNOSIS — I82.409 ACUTE EMBOLISM AND THROMBOSIS OF UNSPECIFIED DEEP VEINS OF UNSPECIFIED LOWER EXTREMITY: ICD-10-CM

## 2019-11-12 NOTE — ED ADULT NURSE NOTE - CHIEF COMPLAINT QUOTE
Pt. c/o epigastric pain radiating to back with diarrhea starting around 5 AM. Denies n/v, cp or sob.
Statement Selected

## 2019-11-19 ENCOUNTER — RESULT REVIEW (OUTPATIENT)
Age: 34
End: 2019-11-19

## 2019-11-19 ENCOUNTER — APPOINTMENT (OUTPATIENT)
Dept: HEMATOLOGY ONCOLOGY | Facility: CLINIC | Age: 34
End: 2019-11-19
Payer: MEDICAID

## 2019-11-19 VITALS
OXYGEN SATURATION: 99 % | SYSTOLIC BLOOD PRESSURE: 106 MMHG | TEMPERATURE: 98.2 F | HEART RATE: 66 BPM | WEIGHT: 293 LBS | DIASTOLIC BLOOD PRESSURE: 69 MMHG | RESPIRATION RATE: 16 BRPM | HEIGHT: 66.73 IN | BODY MASS INDEX: 46.53 KG/M2

## 2019-11-19 DIAGNOSIS — I82.492 ACUTE EMBOLISM AND THROMBOSIS OF OTHER SPECIFIED DEEP VEIN OF LEFT LOWER EXTREMITY: ICD-10-CM

## 2019-11-19 LAB
APCR PPP: 2.97 RATIO
AT III PPP CHRO-ACNC: 101 %
BASOPHILS # BLD AUTO: 0 K/UL — SIGNIFICANT CHANGE UP (ref 0–0.2)
BASOPHILS NFR BLD AUTO: 0.6 % — SIGNIFICANT CHANGE UP (ref 0–2)
CONFIRM: 31.2 SEC
DEPRECATED D DIMER PPP IA-ACNC: <150 NG/ML DDU
DRVVT IMM 1:2 NP PPP: NORMAL
DRVVT SCREEN TO CONFIRM RATIO: 0.97 RATIO
EOSINOPHIL # BLD AUTO: 0.2 K/UL — SIGNIFICANT CHANGE UP (ref 0–0.5)
EOSINOPHIL NFR BLD AUTO: 4.8 % — SIGNIFICANT CHANGE UP (ref 0–6)
FERRITIN SERPL-MCNC: 76 NG/ML
FOLATE SERPL-MCNC: 11.5 NG/ML
FVL BLANK: 37.8
FVL TEST: 112.1
HCT VFR BLD CALC: 34.6 % — SIGNIFICANT CHANGE UP (ref 34.5–45)
HGB BLD-MCNC: 11.3 G/DL — LOW (ref 11.5–15.5)
IRON SATN MFR SERPL: 13 %
IRON SERPL-MCNC: 40 UG/DL
LYMPHOCYTES # BLD AUTO: 2 K/UL — SIGNIFICANT CHANGE UP (ref 1–3.3)
LYMPHOCYTES # BLD AUTO: 48.1 % — HIGH (ref 13–44)
MCHC RBC-ENTMCNC: 29.4 PG — SIGNIFICANT CHANGE UP (ref 27–34)
MCHC RBC-ENTMCNC: 32.7 G/DL — SIGNIFICANT CHANGE UP (ref 32–36)
MCV RBC AUTO: 89.7 FL — SIGNIFICANT CHANGE UP (ref 80–100)
MONOCYTES # BLD AUTO: 0.4 K/UL — SIGNIFICANT CHANGE UP (ref 0–0.9)
MONOCYTES NFR BLD AUTO: 8.8 % — SIGNIFICANT CHANGE UP (ref 2–14)
NEUTROPHILS # BLD AUTO: 1.5 K/UL — LOW (ref 1.8–7.4)
NEUTROPHILS NFR BLD AUTO: 37.7 % — LOW (ref 43–77)
PLATELET # BLD AUTO: 246 K/UL — SIGNIFICANT CHANGE UP (ref 150–400)
PROT C PPP CHRO-ACNC: 87 %
RBC # BLD: 3.85 M/UL — SIGNIFICANT CHANGE UP (ref 3.8–5.2)
RBC # FLD: 13.5 % — SIGNIFICANT CHANGE UP (ref 10.3–14.5)
SCREEN DRVVT: 33.7 SEC
TIBC SERPL-MCNC: 300 UG/DL
UIBC SERPL-MCNC: 260 UG/DL
VIT B12 SERPL-MCNC: 351 PG/ML
WBC # BLD: 4.1 K/UL — SIGNIFICANT CHANGE UP (ref 3.8–10.5)
WBC # FLD AUTO: 4.1 K/UL — SIGNIFICANT CHANGE UP (ref 3.8–10.5)

## 2019-11-19 PROCEDURE — 99205 OFFICE O/P NEW HI 60 MIN: CPT

## 2019-11-20 LAB
CARDIOLIPIN AB SER IA-ACNC: NEGATIVE
CARDIOLIPIN IGM SER-MCNC: 6.2 GPL
DEPRECATED CARDIOLIPIN IGA SER: <5 APL

## 2019-11-20 NOTE — ASSESSMENT
[FreeTextEntry1] : # Unprovoked left popliteal vein deep vein thrombosis\par -11/2018, has been on Eliquis for apprx. 1 year but patient stopped taking meds 2-3 weeks ago.\par -Tentative plan for cholecystectomy, pt will meet a surgeon and schedule it soon.\par -Labs from May reviewed, first set not significant for antiphospholipid Ab syndrome but will repeat the labs today as patient is off Eliqius for 2 weeks.\par -Remain off Eliquis.\par -Will speak with the patient when the labs come back.\par \par #Anemia\par -Reports normal periods, no known PUD/GI issues.\par -No history of sickle cell disease in the family or patient herself.\par -Recheck CBC and iron panel/B12/folate/Hb electrophoresis.\par -RTC in 4-6 weeks.

## 2019-11-20 NOTE — PHYSICAL EXAM
[Fully active, able to carry on all pre-disease performance without restriction] : Status 0 - Fully active, able to carry on all pre-disease performance without restriction [Obese] : obese [Normal] : affect appropriate [de-identified] : morbidly obese [de-identified] : deferred [de-identified] : lordosis

## 2019-11-20 NOTE — HISTORY OF PRESENT ILLNESS
[Disease:__________________________] : Disease: [unfilled] [de-identified] : 34 year-old female with PMH of unprovoked left popliteal vein DVT presented today to discuss about current blood thinner use before the surgery. She has been on Eliquis 5mg BID for 1 year, but stopped using it 2 weeks ago by herself. Patient recently visited ED with abdominal pain and was found to have cholelithiasis for which surgeon recommended cholecystectomy. For the DVT, 1 year ago patient had sudden onset left leg pain with cramp that persisted for a few days, she visited ED and was diagnosed with acute DVT. That time she did not travel long distance, take OCP, have cancer, or have sedentary life style. 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 Not a smoker, denied using illicit drug or EtOH. No family history of autoimmune disorder/malignancy/blood disorder.\par Patient was evaluated by a rheumatologist in May given a history of 2 times of 1st trimester miscarriage and DVT. Her miscarriages were all before 10 weeks of pregnancy. Work-ups were done and showed +SOPHY at 1:640 speckled, +SSA Ab, and negative DS-DNA, anti Helton, B2 GP, lupus anticoagulant, and anticardiolipin.\par Patient currently denies having any symptoms such as leg pain, or chest pain. Of note, her recent lab showed Hb of 10, but she was not aware of her anemia. Pt does have intermittent dyspnea on exertion and fatigue.  [de-identified] : n/a [de-identified] : DVT- spontaneous [Treatment Protocol] : Treatment Protocol [FreeTextEntry1] : Eliquis x 12months ( on and off, pt w/ hx of poor compliance)

## 2019-11-20 NOTE — REVIEW OF SYSTEMS
[Negative] : Allergic/Immunologic [SOB on Exertion] : shortness of breath during exertion [Difficulty Walking] : difficulty walking [Lower Ext Edema] : lower extremity edema [de-identified] : obesity related [FreeTextEntry5] : obesity related

## 2019-11-20 NOTE — END OF VISIT
[] : Fellow [>50% of Time Spent on Counseling for ____] : Greater than 50% of the encounter time was spent on counseling for [unfilled] [FreeTextEntry3] : Additonal notes documented by me in plan section as well as TBB section of note.

## 2019-11-20 NOTE — CONSULT LETTER
[Dear  ___] : Dear  [unfilled], [Consult Letter:] : I had the pleasure of evaluating your patient, [unfilled]. [Consult Closing:] : Thank you very much for allowing me to participate in the care of this patient.  If you have any questions, please do not hesitate to contact me. [Please see my note below.] : Please see my note below. [Sincerely,] : Sincerely, [FreeTextEntry3] : Frances Maldonado MD. MS. \par Hematologist/Oncologist\par [facility]\par ph. [phone]\par fx. [phone]\par

## 2019-11-20 NOTE — REASON FOR VISIT
[Initial Consultation] : an initial consultation for [Coagulopathy] : coagulopathy [FreeTextEntry2] : unprovoked DVT

## 2019-11-21 LAB
B2 GLYCOPROT1 IGA SERPL IA-ACNC: <5 SAU
B2 GLYCOPROT1 IGG SER-ACNC: <5 SGU
B2 GLYCOPROT1 IGM SER-ACNC: <5 SMU
CARDIOLIPIN IGM SER-MCNC: 5.7 MPL
HGB A MFR BLD: 96.9 %
HGB A2 MFR BLD: 3.1 %
HGB FRACT BLD-IMP: NORMAL

## 2019-11-25 LAB — PROT S FREE PPP-ACNC: 48 % NORMAL

## 2019-11-27 ENCOUNTER — RESULT REVIEW (OUTPATIENT)
Age: 34
End: 2019-11-27

## 2019-11-29 ENCOUNTER — OTHER (OUTPATIENT)
Age: 34
End: 2019-11-29

## 2019-12-05 ENCOUNTER — RESULT REVIEW (OUTPATIENT)
Age: 34
End: 2019-12-05

## 2019-12-05 ENCOUNTER — APPOINTMENT (OUTPATIENT)
Dept: HEMATOLOGY ONCOLOGY | Facility: CLINIC | Age: 34
End: 2019-12-05

## 2019-12-05 ENCOUNTER — OUTPATIENT (OUTPATIENT)
Dept: OUTPATIENT SERVICES | Facility: HOSPITAL | Age: 34
LOS: 1 days | Discharge: HOME | End: 2019-12-05

## 2019-12-05 DIAGNOSIS — I82.409 ACUTE EMBOLISM AND THROMBOSIS OF UNSPECIFIED DEEP VEINS OF UNSPECIFIED LOWER EXTREMITY: ICD-10-CM

## 2019-12-05 LAB
BASOPHILS # BLD AUTO: 0 K/UL — SIGNIFICANT CHANGE UP (ref 0–0.2)
BASOPHILS NFR BLD AUTO: 0.8 % — SIGNIFICANT CHANGE UP (ref 0–2)
EOSINOPHIL # BLD AUTO: 0.1 K/UL — SIGNIFICANT CHANGE UP (ref 0–0.5)
EOSINOPHIL NFR BLD AUTO: 3.8 % — SIGNIFICANT CHANGE UP (ref 0–6)
HCT VFR BLD CALC: 33.8 % — LOW (ref 34.5–45)
HGB BLD-MCNC: 11.1 G/DL — LOW (ref 11.5–15.5)
LYMPHOCYTES # BLD AUTO: 1.9 K/UL — SIGNIFICANT CHANGE UP (ref 1–3.3)
LYMPHOCYTES # BLD AUTO: 50.9 % — HIGH (ref 13–44)
MCHC RBC-ENTMCNC: 29.2 PG — SIGNIFICANT CHANGE UP (ref 27–34)
MCHC RBC-ENTMCNC: 32.9 G/DL — SIGNIFICANT CHANGE UP (ref 32–36)
MCV RBC AUTO: 88.9 FL — SIGNIFICANT CHANGE UP (ref 80–100)
MONOCYTES # BLD AUTO: 0.4 K/UL — SIGNIFICANT CHANGE UP (ref 0–0.9)
MONOCYTES NFR BLD AUTO: 10.4 % — SIGNIFICANT CHANGE UP (ref 2–14)
NEUTROPHILS # BLD AUTO: 1.3 K/UL — LOW (ref 1.8–7.4)
NEUTROPHILS NFR BLD AUTO: 34 % — LOW (ref 43–77)
PLATELET # BLD AUTO: 225 K/UL — SIGNIFICANT CHANGE UP (ref 150–400)
PROT S AG ACT/NOR PPP IA: 40 %
RBC # BLD: 3.8 M/UL — SIGNIFICANT CHANGE UP (ref 3.8–5.2)
RBC # FLD: 13.1 % — SIGNIFICANT CHANGE UP (ref 10.3–14.5)
WBC # BLD: 3.8 K/UL — SIGNIFICANT CHANGE UP (ref 3.8–10.5)
WBC # FLD AUTO: 3.8 K/UL — SIGNIFICANT CHANGE UP (ref 3.8–10.5)

## 2019-12-19 ENCOUNTER — APPOINTMENT (OUTPATIENT)
Dept: HEMATOLOGY ONCOLOGY | Facility: CLINIC | Age: 34
End: 2019-12-19
Payer: MEDICAID

## 2019-12-19 VITALS
HEART RATE: 79 BPM | DIASTOLIC BLOOD PRESSURE: 71 MMHG | OXYGEN SATURATION: 99 % | TEMPERATURE: 98.1 F | SYSTOLIC BLOOD PRESSURE: 112 MMHG | BODY MASS INDEX: 46.82 KG/M2 | WEIGHT: 293 LBS | RESPIRATION RATE: 18 BRPM

## 2019-12-19 PROCEDURE — 99214 OFFICE O/P EST MOD 30 MIN: CPT

## 2019-12-19 RX ORDER — APIXABAN 5 MG/1
5 TABLET, FILM COATED ORAL
Qty: 60 | Refills: 3 | Status: DISCONTINUED | COMMUNITY
Start: 2019-06-10 | End: 2019-12-19

## 2019-12-19 RX ORDER — APIXABAN 5 MG/1
5 TABLET, FILM COATED ORAL
Qty: 120 | Refills: 0 | Status: DISCONTINUED | COMMUNITY
Start: 2018-12-10 | End: 2019-12-19

## 2019-12-19 NOTE — RESULTS/DATA
[FreeTextEntry1] : \par  Protein S Antigen Assay, Total             Final\par \par No Documents Attached\par \par \par   Test   Result   Flag Reference Goal \par  Protein S Free Antigen 40 % L   \par \par  Ordered by: CAROLYN SOMERS       Collected/Examined: 05Dec2019 11:33AM       \par Verified by: CAROLYN SOMERS 05Dec2019 05:12PM       \par  Result Communication: No patient communication needed at this time;\par Stage: Final       \par  Performed at: [facility] (Med Director: Kong Sellers M.D)       Resulted: 05Dec2019 02:50PM       Last Updated: 05Dec2019 05:12PM       Accession: [id number]       \par \par  Protein S Free Activity Assay             Final\par \par No Documents Attached\par \par \par   Test   Result   Flag Reference Goal \par  Protein S Activity 48 % normal L   \par   Decreased levels of Protein S activity may be found in patients\par with hereditary deficiency, warfarin therapy, vitamin k\par deficiency, liver disease, DIC, or recent thrombosis as well as\par after surgery. In addition, it may be physiologic in pregnancy.\par An elevated Protein S activity is not clinically significant.\par Only deficiencies are associated with an increased thrombotic\par risk.\par \par  Test(s) performed at:\par     [facility]\par     Estela Sawant M.D., Ph.D., [place], \par     [address] [place] [address]\[place] [address]\par     [place] #[id number]\par \par  \par \par  Ordered by: CAROLYN SOMERS       Collected/Examined: 19Nov2019 10:59AM       \par Verified by: CAROLYN SOMERS 25Nov2019 11:44AM       \par  Result Communication: No patient communication needed at this time;\par Stage: Final       \par  Performed at: [facility] (Med Director: Kong Sellers M.D)       Resulted: 23Nov2019 04:48AM       Last Updated: 25Nov2019 11:44AM       Accession: [id number]       \par

## 2019-12-19 NOTE — HISTORY OF PRESENT ILLNESS
[Disease:__________________________] : Disease: [unfilled] [de-identified] : n/a [de-identified] : Nov 19, 2019\par 34 year-old female with PMH of unprovoked left popliteal vein DVT presented today to discuss about current blood thinner use before the surgery. She has been on Eliquis 5mg BID for 1 year, but stopped using it 2 weeks ago by herself. Patient recently visited ED with abdominal pain and was found to have cholelithiasis for which surgeon recommended cholecystectomy. For the DVT, 1 year ago patient had sudden onset left leg pain with cramp that persisted for a few days, she visited ED and was diagnosed with acute DVT. That time she did not travel long distance, take OCP, have cancer, or have sedentary life style. 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 Not a smoker, denied using illicit drug or EtOH. No family history of autoimmune disorder/malignancy/blood disorder.\par Patient was evaluated by a rheumatologist in May given a history of 2 times of 1st trimester miscarriage and DVT. Her miscarriages were all before 10 weeks of pregnancy. Work-ups were done and showed +SOPHY at 1:640 speckled, +SSA Ab, and negative DS-DNA, anti Helton, B2 GP, lupus anticoagulant, and anticardiolipin.\par Patient currently denies having any symptoms such as leg pain, or chest pain. Of note, her recent lab showed Hb of 10, but she was not aware of her anemia. Pt does have intermittent dyspnea on exertion and fatigue.  [de-identified] : DVT- spontaneous [Treatment Protocol] : Treatment Protocol [FreeTextEntry1] : eliquis px [de-identified] : Pt is here for scheduled f/u. She has had repeated labs done for thrombophilia eval and found on repeat tests to have protein S deficiency. Pt is here for f/u recc based on new lab findings. She has sister on speaker phone to be apart of conversation.

## 2019-12-19 NOTE — PHYSICAL EXAM
[Restricted in physically strenuous activity but ambulatory and able to carry out work of a light or sedentary nature] : Status 1- Restricted in physically strenuous activity but ambulatory and able to carry out work of a light or sedentary nature, e.g., light house work, office work [Obese] : obese [Normal] : affect appropriate [de-identified] : morbidly obese [de-identified] : deferred [de-identified] : lordosis

## 2020-03-26 ENCOUNTER — OUTPATIENT (OUTPATIENT)
Dept: OUTPATIENT SERVICES | Facility: HOSPITAL | Age: 35
LOS: 1 days | Discharge: HOME | End: 2020-03-26

## 2020-03-26 DIAGNOSIS — I82.409 ACUTE EMBOLISM AND THROMBOSIS OF UNSPECIFIED DEEP VEINS OF UNSPECIFIED LOWER EXTREMITY: ICD-10-CM

## 2020-04-02 ENCOUNTER — APPOINTMENT (OUTPATIENT)
Dept: HEMATOLOGY ONCOLOGY | Facility: CLINIC | Age: 35
End: 2020-04-02

## 2020-04-16 ENCOUNTER — APPOINTMENT (OUTPATIENT)
Dept: HEMATOLOGY ONCOLOGY | Facility: CLINIC | Age: 35
End: 2020-04-16

## 2020-04-22 ENCOUNTER — OUTPATIENT (OUTPATIENT)
Dept: OUTPATIENT SERVICES | Facility: HOSPITAL | Age: 35
LOS: 1 days | Discharge: HOME | End: 2020-04-22

## 2020-04-22 DIAGNOSIS — I82.409 ACUTE EMBOLISM AND THROMBOSIS OF UNSPECIFIED DEEP VEINS OF UNSPECIFIED LOWER EXTREMITY: ICD-10-CM

## 2020-04-28 ENCOUNTER — APPOINTMENT (OUTPATIENT)
Dept: HEMATOLOGY ONCOLOGY | Facility: CLINIC | Age: 35
End: 2020-04-28

## 2020-06-22 ENCOUNTER — APPOINTMENT (OUTPATIENT)
Dept: VASCULAR SURGERY | Facility: CLINIC | Age: 35
End: 2020-06-22
Payer: MEDICAID

## 2020-06-22 VITALS
SYSTOLIC BLOOD PRESSURE: 106 MMHG | DIASTOLIC BLOOD PRESSURE: 74 MMHG | WEIGHT: 260 LBS | BODY MASS INDEX: 41.05 KG/M2 | HEART RATE: 83 BPM

## 2020-06-22 PROCEDURE — 99213 OFFICE O/P EST LOW 20 MIN: CPT

## 2020-06-22 PROCEDURE — 93970 EXTREMITY STUDY: CPT

## 2020-06-22 NOTE — PHYSICAL EXAM
[de-identified] : On physical examination the patient is NAD. Neurologically intact. The lungs are clear to auscultation and the heart has a regular rate and rhythm. The abdomen is soft, without tenderness or pulsatile mass. Bilateral femoral, pedal, and upper extremity pulses are palpable. \par \par A lower extremity venous duplex ultrasound was performed in the office today, which showed:\par -No evidence of DVT/SVT in either extremity\par

## 2020-06-22 NOTE — HISTORY OF PRESENT ILLNESS
[FreeTextEntry1] : I have just had the pleasure of seeing Mrs. Coleen Hagan  in follow up for left lower extremity deep vein thrombosis.\par \par Mrs. Hagan is a pleasant 35-year-old lady with a history of two spontaneous abortions who previously presented with a left popliteal vein DVT that was diagnosed on ultrasonography at [facility] on 11/21/18. She developed left leg pain a few days prior, which prompted her to seek evaluation. She denied any prior history of travel, trauma, periods of immobility or other predisposing factors. She denied any prior history of DVT, SVT or PE. \par \par During her prior visit, I instructed her to undergo evaluation by Hematology for a hypercoagulable workup, which she has completed. She continues to take Eliquis per their recommendation.\par \par Since her last visit, Mrs. Hagan denies any new leg pain or edema. She reports that she fell at work a month ago and landed forward on her shoulder, which continues to give her pain. She reports that she had an MRI of the shoulder and spine and is following up with workers compensation physicians. \par Medial history is otherwise significant for GERD and obesity (BMI 45)\par \par Medications: None\par \par Allergies: NKDA\par \par Social history: Non-smoker\par \par FH: NC\par \par

## 2020-06-22 NOTE — ASSESSMENT
[FreeTextEntry1] : In summary, Mrs. Lopez presents with a history of left popliteal vein DVT, which has resolved at this point. She should continue anticoagulation per Hematology recommendations. She will return for a screening DVT duplex in one year.

## 2021-06-21 ENCOUNTER — APPOINTMENT (OUTPATIENT)
Dept: VASCULAR SURGERY | Facility: CLINIC | Age: 36
End: 2021-06-21
Payer: MEDICAID

## 2021-06-21 VITALS
HEART RATE: 73 BPM | SYSTOLIC BLOOD PRESSURE: 105 MMHG | TEMPERATURE: 94.6 F | WEIGHT: 260 LBS | BODY MASS INDEX: 44.39 KG/M2 | HEIGHT: 64 IN | DIASTOLIC BLOOD PRESSURE: 75 MMHG

## 2021-06-21 PROCEDURE — 99213 OFFICE O/P EST LOW 20 MIN: CPT

## 2021-06-21 PROCEDURE — 93970 EXTREMITY STUDY: CPT

## 2021-06-29 NOTE — ASSESSMENT
[FreeTextEntry1] : In summary, Mrs. Hagan presents with a history of DVT and hypercoagulable state. Duplex performed today showed no new DVT. I instructed her to follow up with Hematology immediately so that anticoagulation may be resumed. Contact information was provided to Hematology.

## 2021-06-29 NOTE — HISTORY OF PRESENT ILLNESS
[FreeTextEntry1] : I have just had the pleasure of seeing Mrs. Coleen Hagan  in follow up for left lower extremity deep vein thrombosis.\par \par Mrs. Hagan is a pleasant 36-year-old lady with a history of two spontaneous abortions who previously presented with a left popliteal vein DVT that was diagnosed on ultrasonography at [facility] on 11/21/18. She developed left leg pain a few days prior, which prompted her to seek evaluation. She denied any prior history of travel, trauma, periods of immobility or other predisposing factors. She denied any prior history of DVT, SVT or PE. \par \par During her prior visit, I instructed her to undergo evaluation by Hematology for a hypercoagulable workup, which she has completed. She was instructed to take Eliquis, but she has discontinued use of the medication and not followed up with Hematology.\par \par Since her last visit, Mrs. Hagan denies any new leg pain or edema. \par \par Medial history is otherwise significant for GERD and obesity (BMI 45)\par \par Medications: None\par \par Allergies: NKDA\par \par Social history: Non-smoker\par \par FH: NC\par \par

## 2021-06-29 NOTE — PHYSICAL EXAM
[de-identified] : On physical examination the patient is NAD. Neurologically intact. The lungs are clear to auscultation and the heart has a regular rate and rhythm. The abdomen is soft, without tenderness or pulsatile mass. Bilateral femoral, pedal, and upper extremity pulses are palpable. \par \par A lower extremity venous duplex ultrasound was performed in the office today, which showed:\par -No evidence of DVT/SVT in either extremity\par

## 2021-07-21 ENCOUNTER — OUTPATIENT (OUTPATIENT)
Dept: OUTPATIENT SERVICES | Facility: HOSPITAL | Age: 36
LOS: 1 days | Discharge: HOME | End: 2021-07-21

## 2021-07-21 DIAGNOSIS — I82.409 ACUTE EMBOLISM AND THROMBOSIS OF UNSPECIFIED DEEP VEINS OF UNSPECIFIED LOWER EXTREMITY: ICD-10-CM

## 2021-07-23 ENCOUNTER — RESULT REVIEW (OUTPATIENT)
Age: 36
End: 2021-07-23

## 2021-07-23 ENCOUNTER — APPOINTMENT (OUTPATIENT)
Dept: HEMATOLOGY ONCOLOGY | Facility: CLINIC | Age: 36
End: 2021-07-23
Payer: MEDICAID

## 2021-07-23 ENCOUNTER — APPOINTMENT (OUTPATIENT)
Dept: HEMATOLOGY ONCOLOGY | Facility: CLINIC | Age: 36
End: 2021-07-23

## 2021-07-23 VITALS
SYSTOLIC BLOOD PRESSURE: 120 MMHG | HEIGHT: 67.05 IN | WEIGHT: 293 LBS | OXYGEN SATURATION: 99 % | HEART RATE: 77 BPM | BODY MASS INDEX: 45.99 KG/M2 | DIASTOLIC BLOOD PRESSURE: 88 MMHG | TEMPERATURE: 97.2 F | RESPIRATION RATE: 16 BRPM

## 2021-07-23 LAB
ALBUMIN SERPL ELPH-MCNC: 3.6 G/DL
ALP BLD-CCNC: 83 U/L
ALT SERPL-CCNC: 9 U/L
ANION GAP SERPL CALC-SCNC: 9 MMOL/L
AST SERPL-CCNC: 14 U/L
BASOPHILS # BLD AUTO: 0 K/UL — SIGNIFICANT CHANGE UP (ref 0–0.2)
BASOPHILS NFR BLD AUTO: 0 % — SIGNIFICANT CHANGE UP (ref 0–2)
BILIRUB SERPL-MCNC: 0.3 MG/DL
BUN SERPL-MCNC: 9 MG/DL
CALCIUM SERPL-MCNC: 9.1 MG/DL
CHLORIDE SERPL-SCNC: 105 MMOL/L
CO2 SERPL-SCNC: 23 MMOL/L
CREAT SERPL-MCNC: 0.67 MG/DL
EOSINOPHIL # BLD AUTO: 0.23 K/UL — SIGNIFICANT CHANGE UP (ref 0–0.5)
EOSINOPHIL NFR BLD AUTO: 5 % — SIGNIFICANT CHANGE UP (ref 0–6)
FERRITIN SERPL-MCNC: 116 NG/ML
GLUCOSE SERPL-MCNC: 91 MG/DL
HCT VFR BLD CALC: 36.6 % — SIGNIFICANT CHANGE UP (ref 34.5–45)
HGB BLD-MCNC: 12.1 G/DL — SIGNIFICANT CHANGE UP (ref 11.5–15.5)
IRON SATN MFR SERPL: 16 %
IRON SERPL-MCNC: 48 UG/DL
LYMPHOCYTES # BLD AUTO: 2.25 K/UL — SIGNIFICANT CHANGE UP (ref 1–3.3)
LYMPHOCYTES # BLD AUTO: 48 % — HIGH (ref 13–44)
MCHC RBC-ENTMCNC: 29.4 PG — SIGNIFICANT CHANGE UP (ref 27–34)
MCHC RBC-ENTMCNC: 33.1 G/DL — SIGNIFICANT CHANGE UP (ref 32–36)
MCV RBC AUTO: 88.8 FL — SIGNIFICANT CHANGE UP (ref 80–100)
MONOCYTES # BLD AUTO: 0.42 K/UL — SIGNIFICANT CHANGE UP (ref 0–0.9)
MONOCYTES NFR BLD AUTO: 9 % — SIGNIFICANT CHANGE UP (ref 2–14)
NEUTROPHILS # BLD AUTO: 1.78 K/UL — LOW (ref 1.8–7.4)
NEUTROPHILS NFR BLD AUTO: 38 % — LOW (ref 43–77)
NRBC # BLD: 0 /100 — SIGNIFICANT CHANGE UP (ref 0–0)
NRBC # BLD: SIGNIFICANT CHANGE UP /100 WBCS (ref 0–0)
PLAT MORPH BLD: NORMAL — SIGNIFICANT CHANGE UP
PLATELET # BLD AUTO: 266 K/UL — SIGNIFICANT CHANGE UP (ref 150–400)
POTASSIUM SERPL-SCNC: 4.9 MMOL/L
PROT SERPL-MCNC: 7.8 G/DL
RBC # BLD: 4.12 M/UL — SIGNIFICANT CHANGE UP (ref 3.8–5.2)
RBC # FLD: 14.7 % — HIGH (ref 10.3–14.5)
RBC BLD AUTO: SIGNIFICANT CHANGE UP
SODIUM SERPL-SCNC: 136 MMOL/L
TIBC SERPL-MCNC: 292 UG/DL
UIBC SERPL-MCNC: 244 UG/DL
WBC # BLD: 4.69 K/UL — SIGNIFICANT CHANGE UP (ref 3.8–10.5)
WBC # FLD AUTO: 4.69 K/UL — SIGNIFICANT CHANGE UP (ref 3.8–10.5)

## 2021-07-23 PROCEDURE — 99214 OFFICE O/P EST MOD 30 MIN: CPT

## 2021-07-27 RX ORDER — METRONIDAZOLE 7.5 MG/G
0.75 GEL VAGINAL
Qty: 70 | Refills: 0 | Status: COMPLETED | COMMUNITY
Start: 2021-03-16

## 2021-07-27 RX ORDER — FLUCONAZOLE 150 MG/1
150 TABLET ORAL
Qty: 1 | Refills: 0 | Status: DISCONTINUED | COMMUNITY
Start: 2021-03-16 | End: 2021-07-27

## 2021-09-20 ENCOUNTER — OUTPATIENT (OUTPATIENT)
Dept: OUTPATIENT SERVICES | Facility: HOSPITAL | Age: 36
LOS: 1 days | Discharge: HOME | End: 2021-09-20

## 2021-09-20 DIAGNOSIS — I82.409 ACUTE EMBOLISM AND THROMBOSIS OF UNSPECIFIED DEEP VEINS OF UNSPECIFIED LOWER EXTREMITY: ICD-10-CM

## 2021-09-21 ENCOUNTER — APPOINTMENT (OUTPATIENT)
Dept: HEMATOLOGY ONCOLOGY | Facility: CLINIC | Age: 36
End: 2021-09-21
Payer: MEDICAID

## 2021-09-21 ENCOUNTER — RESULT REVIEW (OUTPATIENT)
Age: 36
End: 2021-09-21

## 2021-09-21 VITALS
SYSTOLIC BLOOD PRESSURE: 107 MMHG | RESPIRATION RATE: 18 BRPM | OXYGEN SATURATION: 97 % | TEMPERATURE: 97.9 F | WEIGHT: 293 LBS | DIASTOLIC BLOOD PRESSURE: 74 MMHG | BODY MASS INDEX: 45.99 KG/M2 | HEART RATE: 73 BPM | HEIGHT: 67.05 IN

## 2021-09-21 DIAGNOSIS — E66.01 MORBID (SEVERE) OBESITY DUE TO EXCESS CALORIES: ICD-10-CM

## 2021-09-21 LAB
BASOPHILS # BLD AUTO: 0.04 K/UL — SIGNIFICANT CHANGE UP (ref 0–0.2)
BASOPHILS NFR BLD AUTO: 0.4 % — SIGNIFICANT CHANGE UP (ref 0–2)
EOSINOPHIL # BLD AUTO: 0.03 K/UL — SIGNIFICANT CHANGE UP (ref 0–0.5)
EOSINOPHIL NFR BLD AUTO: 0.3 % — SIGNIFICANT CHANGE UP (ref 0–6)
HCT VFR BLD CALC: 36.4 % — SIGNIFICANT CHANGE UP (ref 34.5–45)
HGB BLD-MCNC: 12 G/DL — SIGNIFICANT CHANGE UP (ref 11.5–15.5)
IMM GRANULOCYTES NFR BLD AUTO: 0.5 % — SIGNIFICANT CHANGE UP (ref 0–1.5)
LYMPHOCYTES # BLD AUTO: 3.96 K/UL — HIGH (ref 1–3.3)
LYMPHOCYTES # BLD AUTO: 35.2 % — SIGNIFICANT CHANGE UP (ref 13–44)
MCHC RBC-ENTMCNC: 28.6 PG — SIGNIFICANT CHANGE UP (ref 27–34)
MCHC RBC-ENTMCNC: 33 G/DL — SIGNIFICANT CHANGE UP (ref 32–36)
MCV RBC AUTO: 86.7 FL — SIGNIFICANT CHANGE UP (ref 80–100)
MONOCYTES # BLD AUTO: 1.01 K/UL — HIGH (ref 0–0.9)
MONOCYTES NFR BLD AUTO: 9 % — SIGNIFICANT CHANGE UP (ref 2–14)
NEUTROPHILS # BLD AUTO: 6.15 K/UL — SIGNIFICANT CHANGE UP (ref 1.8–7.4)
NEUTROPHILS NFR BLD AUTO: 54.6 % — SIGNIFICANT CHANGE UP (ref 43–77)
NRBC # BLD: 0 /100 WBCS — SIGNIFICANT CHANGE UP (ref 0–0)
PLATELET # BLD AUTO: 319 K/UL — SIGNIFICANT CHANGE UP (ref 150–400)
RBC # BLD: 4.2 M/UL — SIGNIFICANT CHANGE UP (ref 3.8–5.2)
RBC # FLD: 14.6 % — HIGH (ref 10.3–14.5)
WBC # BLD: 11.25 K/UL — HIGH (ref 3.8–10.5)
WBC # FLD AUTO: 11.25 K/UL — HIGH (ref 3.8–10.5)

## 2021-09-21 PROCEDURE — 99215 OFFICE O/P EST HI 40 MIN: CPT

## 2021-09-21 RX ORDER — HYDROCORTISONE 25 MG/G
2.5 CREAM TOPICAL
Qty: 28 | Refills: 0 | Status: DISCONTINUED | COMMUNITY
Start: 2021-08-15

## 2021-09-21 RX ORDER — LORATADINE 10 MG/1
10 TABLET ORAL
Qty: 30 | Refills: 0 | Status: DISCONTINUED | COMMUNITY
Start: 2021-09-10

## 2021-09-21 RX ORDER — PREDNISONE 20 MG/1
20 TABLET ORAL
Qty: 10 | Refills: 0 | Status: ACTIVE | COMMUNITY
Start: 2021-09-15

## 2021-09-21 RX ORDER — ALBUTEROL SULFATE 90 UG/1
108 (90 BASE) INHALANT RESPIRATORY (INHALATION)
Qty: 7 | Refills: 0 | Status: DISCONTINUED | COMMUNITY
Start: 2021-09-15

## 2021-09-21 RX ORDER — AZITHROMYCIN 250 MG/1
250 TABLET, FILM COATED ORAL
Qty: 6 | Refills: 0 | Status: DISCONTINUED | COMMUNITY
Start: 2021-09-15

## 2021-09-22 LAB — PROT S AG ACT/NOR PPP IA: 53 %

## 2021-09-23 LAB — PROT S FREE PPP-ACNC: 58 %

## 2021-09-23 NOTE — REASON FOR VISIT
[Follow-Up Visit] : a follow-up visit for [Coagulopathy] : coagulopathy [FreeTextEntry2] : DVT/Protein S deficiency.  This is the patient's first with this provider team.  She is transferred to our care after Dr. Aida Yeboah has left the practice.

## 2021-09-23 NOTE — PHYSICAL EXAM
[Restricted in physically strenuous activity but ambulatory and able to carry out work of a light or sedentary nature] : Status 1- Restricted in physically strenuous activity but ambulatory and able to carry out work of a light or sedentary nature, e.g., light house work, office work [Normal] : affect appropriate [Obese] : obese [de-identified] : bilateral large legs without palpable venous cords [de-identified] : LUE limited

## 2021-09-23 NOTE — HISTORY OF PRESENT ILLNESS
[Disease:__________________________] : Disease: [unfilled] [Treatment Protocol] : Treatment Protocol [Cardiovascular] : Cardiovascular [Constitutional] : Constitutional [ENT] : ENT [Dermatologic] : Dermatologic [Endocrine] : Endocrine [Gastrointestinal] : Gastrointestinal [Genitourinary] : Genitourinary [Gynecologic] : Gynecologic [Infectious] : Infectious [Musculoskeletal] : Musculoskeletal [Neurologic] : Neurologic [Pain] : Pain [Pulmonary] : Pulmonary [Hematologic] : Hematologic [Date: ____________] : Patient's last distress assessment performed on [unfilled]. [0 - No Distress] : Distress Level: 0 [de-identified] : Nov 19, 2019\par 34 year-old female with PMH of unprovoked left popliteal vein DVT presented today to discuss about current blood thinner use before the surgery. She has been on Eliquis 5mg BID for 1 year, but stopped using it 2 weeks ago by herself. Patient recently visited ED with abdominal pain and was found to have cholelithiasis for which surgeon recommended cholecystectomy. For the DVT, 1 year ago patient had sudden onset left leg pain with cramp that persisted for a few days, she visited ED and was diagnosed with acute DVT. That time she did not travel long distance, take OCP, have cancer, or have sedentary life style. 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 Not a smoker, denied using illicit drug or EtOH. No family history of autoimmune disorder/malignancy/blood disorder.\par Patient was evaluated by a rheumatologist in May given a history of 2 times of 1st trimester miscarriage and DVT. Her miscarriages were all before 10 weeks of pregnancy. Work-ups were done and showed +SOPHY at 1:640 speckled, +SSA Ab, and negative DS-DNA, anti Helton, B2 GP, lupus anticoagulant, and anticardiolipin.\par Patient currently denies having any symptoms such as leg pain, or chest pain. Of note, her recent lab showed Hb of 10, but she was not aware of her anemia. Pt does have intermittent dyspnea on exertion and fatigue. \par 09/21/2021 first visit with Dr Vick as single vist.\par julissa remains on Eliquis treatment for history of lower extremity DVT in 2019; no health issues except for outpatient treatment for bronchitis; note the use of steroids and brochodilators [de-identified] : n/a [de-identified] : DVT- spontaneous [FreeTextEntry1] : eliquis px [de-identified] : Since her last visit with Dr. Aida Yeboah in 2019, she has been generally felling well and following up with her primary physician.\par She was ill at home with Coronavirus last year April 2020, the only symptoms she experienced were taste change and loss of smell\par \par She is s/p left shoulder surgery last year, getting PT \par She an out of Eliquis around Jan/Feb\par Started drinking Vervain tea daily over 4 mos ago\par SOB at lying flat \par No chest pain or palpitations;\par \par The patient was seen in urgent care last week for chest pain and she was treated with bronchodilator and with prednisone. She is not on home oxygen. Symptoms have resolved. No hemopytisis

## 2021-09-23 NOTE — ASSESSMENT
[Supportive] : Goals of care discussed with patient: Supportive [Palliative Care Plan] : not applicable at this time [FreeTextEntry1] : Coleen Hagan is  a patient previously seen by Dr Shimon Yeboah who has left the hematology service in January 2021. The patient was previously on Eliquis for periods of time exceeding one year. Prior laboratory findings had included low levels of Protein S. The patient has risk factors which provoke thrombosis including venous incompetence and obesity. She has been off anticoagulation for six months; she had run out of Eliquis and she did not have a renewal.\par Resume Eliquis 2.5 mg PO BID in July 2021 . \par F/U with pulmonologist and vascular.\par She has arranged appointment with nutritionist for help with management of obesity. \par \par 09/21/2021; she has only lost a few pounds. One episode of shortness of breath treated with inhaler and broncho dilator at walk in  [facility]; she decided not to go to Emergency Room service.\par Physical examination today unchanged form July 2021; no wheezing or rales and no dullness o chest examination. Good pulse oximetry and no tachypnea\par recheck Protein S level and activated protein C level. There is no family history of thrombosis so if protein S deficiency exist (prior record in 2019 was 50%)\par This is her first single physician visit at [facility] since December 2019; 22 months during which time she had stopped Eliquis\par RTC 8 weeks\par \par

## 2021-09-23 NOTE — RESULTS/DATA
[FreeTextEntry1] : CBC WBC 11.25 HGB 12.0  000; copy of report printed and provided to patient with an explanation

## 2021-09-23 NOTE — REVIEW OF SYSTEMS
[Negative] : Allergic/Immunologic [FreeTextEntry2] : I lost a couple of pounds; she remains over weight

## 2021-09-28 ENCOUNTER — EMERGENCY (EMERGENCY)
Facility: HOSPITAL | Age: 36
LOS: 0 days | Discharge: HOME | End: 2021-09-28
Attending: EMERGENCY MEDICINE
Payer: MEDICAID

## 2021-09-28 VITALS
HEART RATE: 78 BPM | TEMPERATURE: 99 F | SYSTOLIC BLOOD PRESSURE: 107 MMHG | DIASTOLIC BLOOD PRESSURE: 70 MMHG | OXYGEN SATURATION: 97 % | RESPIRATION RATE: 19 BRPM

## 2021-09-28 VITALS
DIASTOLIC BLOOD PRESSURE: 73 MMHG | SYSTOLIC BLOOD PRESSURE: 112 MMHG | OXYGEN SATURATION: 100 % | WEIGHT: 293 LBS | HEART RATE: 89 BPM | TEMPERATURE: 98 F | HEIGHT: 64 IN | RESPIRATION RATE: 18 BRPM

## 2021-09-28 DIAGNOSIS — R53.1 WEAKNESS: ICD-10-CM

## 2021-09-28 DIAGNOSIS — Z86.718 PERSONAL HISTORY OF OTHER VENOUS THROMBOSIS AND EMBOLISM: ICD-10-CM

## 2021-09-28 DIAGNOSIS — Z79.01 LONG TERM (CURRENT) USE OF ANTICOAGULANTS: ICD-10-CM

## 2021-09-28 DIAGNOSIS — R06.02 SHORTNESS OF BREATH: ICD-10-CM

## 2021-09-28 DIAGNOSIS — R55 SYNCOPE AND COLLAPSE: ICD-10-CM

## 2021-09-28 DIAGNOSIS — R05 COUGH: ICD-10-CM

## 2021-09-28 DIAGNOSIS — R06.00 DYSPNEA, UNSPECIFIED: ICD-10-CM

## 2021-09-28 DIAGNOSIS — N39.0 URINARY TRACT INFECTION, SITE NOT SPECIFIED: ICD-10-CM

## 2021-09-28 DIAGNOSIS — Z20.822 CONTACT WITH AND (SUSPECTED) EXPOSURE TO COVID-19: ICD-10-CM

## 2021-09-28 LAB
ALBUMIN SERPL ELPH-MCNC: 2.7 G/DL — LOW (ref 3.3–5)
ALP SERPL-CCNC: 69 U/L — SIGNIFICANT CHANGE UP (ref 40–120)
ALT FLD-CCNC: 12 U/L — SIGNIFICANT CHANGE UP (ref 12–78)
ANION GAP SERPL CALC-SCNC: 3 MMOL/L — LOW (ref 5–17)
APPEARANCE UR: ABNORMAL
APTT BLD: 32.7 SEC — SIGNIFICANT CHANGE UP (ref 27.5–35.5)
AST SERPL-CCNC: 10 U/L — LOW (ref 15–37)
BACTERIA # UR AUTO: ABNORMAL
BASOPHILS # BLD AUTO: 0.02 K/UL — SIGNIFICANT CHANGE UP (ref 0–0.2)
BASOPHILS NFR BLD AUTO: 0.4 % — SIGNIFICANT CHANGE UP (ref 0–2)
BILIRUB SERPL-MCNC: 0.2 MG/DL — SIGNIFICANT CHANGE UP (ref 0.2–1.2)
BILIRUB UR-MCNC: NEGATIVE — SIGNIFICANT CHANGE UP
BUN SERPL-MCNC: 7 MG/DL — SIGNIFICANT CHANGE UP (ref 7–23)
CALCIUM SERPL-MCNC: 8.4 MG/DL — LOW (ref 8.5–10.1)
CHLORIDE SERPL-SCNC: 108 MMOL/L — SIGNIFICANT CHANGE UP (ref 96–108)
CO2 SERPL-SCNC: 29 MMOL/L — SIGNIFICANT CHANGE UP (ref 22–31)
COLOR SPEC: YELLOW — SIGNIFICANT CHANGE UP
CREAT SERPL-MCNC: 0.68 MG/DL — SIGNIFICANT CHANGE UP (ref 0.5–1.3)
DIFF PNL FLD: ABNORMAL
EOSINOPHIL # BLD AUTO: 0.09 K/UL — SIGNIFICANT CHANGE UP (ref 0–0.5)
EOSINOPHIL NFR BLD AUTO: 1.8 % — SIGNIFICANT CHANGE UP (ref 0–6)
EPI CELLS # UR: SIGNIFICANT CHANGE UP
FLUAV AG NPH QL: SIGNIFICANT CHANGE UP
FLUBV AG NPH QL: SIGNIFICANT CHANGE UP
GLUCOSE SERPL-MCNC: 93 MG/DL — SIGNIFICANT CHANGE UP (ref 70–99)
GLUCOSE UR QL: NEGATIVE MG/DL — SIGNIFICANT CHANGE UP
HCG SERPL-ACNC: <1 MIU/ML — SIGNIFICANT CHANGE UP
HCT VFR BLD CALC: 35.7 % — SIGNIFICANT CHANGE UP (ref 34.5–45)
HGB BLD-MCNC: 11.6 G/DL — SIGNIFICANT CHANGE UP (ref 11.5–15.5)
IMM GRANULOCYTES NFR BLD AUTO: 0.2 % — SIGNIFICANT CHANGE UP (ref 0–1.5)
INR BLD: 1.19 RATIO — HIGH (ref 0.88–1.16)
KETONES UR-MCNC: NEGATIVE — SIGNIFICANT CHANGE UP
LEUKOCYTE ESTERASE UR-ACNC: ABNORMAL
LYMPHOCYTES # BLD AUTO: 1.62 K/UL — SIGNIFICANT CHANGE UP (ref 1–3.3)
LYMPHOCYTES # BLD AUTO: 32.2 % — SIGNIFICANT CHANGE UP (ref 13–44)
MCHC RBC-ENTMCNC: 28.3 PG — SIGNIFICANT CHANGE UP (ref 27–34)
MCHC RBC-ENTMCNC: 32.5 GM/DL — SIGNIFICANT CHANGE UP (ref 32–36)
MCV RBC AUTO: 87.1 FL — SIGNIFICANT CHANGE UP (ref 80–100)
MONOCYTES # BLD AUTO: 0.69 K/UL — SIGNIFICANT CHANGE UP (ref 0–0.9)
MONOCYTES NFR BLD AUTO: 13.7 % — SIGNIFICANT CHANGE UP (ref 2–14)
NEUTROPHILS # BLD AUTO: 2.6 K/UL — SIGNIFICANT CHANGE UP (ref 1.8–7.4)
NEUTROPHILS NFR BLD AUTO: 51.7 % — SIGNIFICANT CHANGE UP (ref 43–77)
NITRITE UR-MCNC: NEGATIVE — SIGNIFICANT CHANGE UP
NRBC # BLD: 0 /100 WBCS — SIGNIFICANT CHANGE UP (ref 0–0)
PH UR: 8 — SIGNIFICANT CHANGE UP (ref 5–8)
PLATELET # BLD AUTO: 297 K/UL — SIGNIFICANT CHANGE UP (ref 150–400)
POTASSIUM SERPL-MCNC: 4.3 MMOL/L — SIGNIFICANT CHANGE UP (ref 3.5–5.3)
POTASSIUM SERPL-SCNC: 4.3 MMOL/L — SIGNIFICANT CHANGE UP (ref 3.5–5.3)
PROT SERPL-MCNC: 8.2 GM/DL — SIGNIFICANT CHANGE UP (ref 6–8.3)
PROT UR-MCNC: 30 MG/DL
PROTHROM AB SERPL-ACNC: 13.7 SEC — HIGH (ref 10.6–13.6)
RBC # BLD: 4.1 M/UL — SIGNIFICANT CHANGE UP (ref 3.8–5.2)
RBC # FLD: 14.6 % — HIGH (ref 10.3–14.5)
RBC CASTS # UR COMP ASSIST: >50 /HPF (ref 0–4)
SARS-COV-2 RNA SPEC QL NAA+PROBE: SIGNIFICANT CHANGE UP
SODIUM SERPL-SCNC: 140 MMOL/L — SIGNIFICANT CHANGE UP (ref 135–145)
SP GR SPEC: 1.01 — SIGNIFICANT CHANGE UP (ref 1.01–1.02)
TROPONIN I SERPL-MCNC: <.015 NG/ML — SIGNIFICANT CHANGE UP (ref 0.01–0.04)
UROBILINOGEN FLD QL: NEGATIVE MG/DL — SIGNIFICANT CHANGE UP
WBC # BLD: 5.03 K/UL — SIGNIFICANT CHANGE UP (ref 3.8–10.5)
WBC # FLD AUTO: 5.03 K/UL — SIGNIFICANT CHANGE UP (ref 3.8–10.5)
WBC UR QL: ABNORMAL

## 2021-09-28 PROCEDURE — 93010 ELECTROCARDIOGRAM REPORT: CPT

## 2021-09-28 PROCEDURE — 71045 X-RAY EXAM CHEST 1 VIEW: CPT | Mod: 26

## 2021-09-28 PROCEDURE — 99285 EMERGENCY DEPT VISIT HI MDM: CPT

## 2021-09-28 PROCEDURE — 71275 CT ANGIOGRAPHY CHEST: CPT | Mod: 26,MA

## 2021-09-28 RX ORDER — CEFUROXIME AXETIL 250 MG
1 TABLET ORAL
Qty: 14 | Refills: 0
Start: 2021-09-28 | End: 2021-10-04

## 2021-09-28 RX ORDER — SODIUM CHLORIDE 9 MG/ML
1000 INJECTION INTRAMUSCULAR; INTRAVENOUS; SUBCUTANEOUS ONCE
Refills: 0 | Status: COMPLETED | OUTPATIENT
Start: 2021-09-28 | End: 2021-09-28

## 2021-09-28 RX ORDER — CEFUROXIME AXETIL 250 MG
250 TABLET ORAL ONCE
Refills: 0 | Status: COMPLETED | OUTPATIENT
Start: 2021-09-28 | End: 2021-09-28

## 2021-09-28 RX ADMIN — Medication 250 MILLIGRAM(S): at 18:26

## 2021-09-28 RX ADMIN — SODIUM CHLORIDE 1000 MILLILITER(S): 9 INJECTION INTRAMUSCULAR; INTRAVENOUS; SUBCUTANEOUS at 16:45

## 2021-09-28 NOTE — ED ADULT NURSE NOTE - NS ED NURSE LEVEL OF CONSCIOUSNESS MENTAL STATUS
Telephone Encounter by Mignon Mckeon at 07/31/18 01:39 PM     Author:  Mignon Mckeon Service:  (none) Author Type:       Filed:  07/31/18 01:42 PM Encounter Date:  7/31/2018 Status:  Signed     :  Mignon Mckeon ()              REYNALDO RED    Patient Age: 57 year old    ACCT STATUS:   MESSAGE:[JC1.1T]   Please see result note 7-28.   Patient was returning call.   Patient wants to speak with \"Violet\" regarding lab. (PT is not aware of message)  Patient states she can be reached at [phone]    To [facility][JC1.1M]     Next and Last Visit with Provider and Department  Next visit with LI BROCK is on No match found  Next visit with [facility] is on No match found  Last visit with LI BROCK was on 07/28/2018 at  9:00 AM in [facility]  Last visit with [facility] was on 07/28/2018 at  9:00 AM in [facility]     WEIGHT AND HEIGHT: As of 07/28/2018 weight is 188.6 lbs.(85.548 kg). Height is 5' 4\"(1.626 m).   BMI is 32.36 kg/(m^2) calculated from:     Height 5' 4\" (1.626 m) as of 7/28/18     Weight 188 lb 9.6 oz (85.548 kg) as of 7/28/18[JC1.1T]      Allergies      Allergen   Reactions   • Aspirin       stomach upset[JC1.2T]        Current outpatient prescriptions       Medication  Sig Dispense Refill   • Cholecalciferol (VITAMIN D-3) 1000 UNITS CAPS 1 daily 1 Each 0   • Omega 3 1000 MG CAPS Take  by mouth.     • Calcium Carbonate-Vit D-Min (CALCIUM 1200) 9930-5508 MG-UNIT CHEW Take  by mouth.        PHARMACY to use:           Pharmacy preference(s) on file: [facility]    CALL BACK INFO:[JC1.1T] Ok to leave response (including medical information) with family member or on answering machine[JC1.1M]  ROUTING:[JC1.1T] Patient's physician/staff[JC1.1M]        PCP: Li Brock MD         INS: Payor: [employer] / Plan: *No Plan* / Product Type: *No Product type* / Note: This is  the primary coverage, but no account was found for this location or the patient's primary location.   ADDRESS:  [address] [place] [address] [address] [address][JC1.1T]         Revision History        User Key Date/Time User Provider Type Action    > JC1.2 07/31/18 01:42 PM Mignon Mckeon  Sign     JC1.1 07/31/18 01:39 PM Mignon Mckeon      M - Manual, T - Template             Awake

## 2021-09-28 NOTE — ED PROVIDER NOTE - OBJECTIVE STATEMENT
36 year old female w/PMH of DVT (on Eliquis) presents to the ED for worsening cough and SOB symptoms. Pt states she had 1st COVID vaccine on 9/11, denies fever/chills but with cough for x2 weeks. States she felt faint prior to presentation in ED earlier today. Reports mostly taking her Eliquis but sometimes will forget.

## 2021-09-28 NOTE — ED ADULT NURSE NOTE - OBJECTIVE STATEMENT
Pt reports several weeks of shortness of breath and chest numbness and intermittent aching 2/10 in severity. To ED c/o of episode of lightheadedness this morning while cooking. After eating, she reports having sensation that she was going to pass out. Pt is aaox4 in ED, respirations even and unlabored but tachypneic at 26/min, no acute distress noted. NSR on cardiac monitor, pulse oximetry 100% on room air. Moving all extremities, no focal weakness, equal strength bilaterally. Hx of DVT in left leg, on Eliquis.

## 2021-09-28 NOTE — ED PROVIDER NOTE - CLINICAL SUMMARY MEDICAL DECISION MAKING FREE TEXT BOX
Pt otherwise well appearing but co sob and near syncope eval for pssibel PE as pt on eliquis for DVT with mixed compliance. CTA ordered and labs and COVID testing to be done as well.

## 2021-09-28 NOTE — ED PROVIDER NOTE - NSTIMEPROVIDERCAREINITIATE_GEN_ER
Assumed care of Pt past report. Assessment in progress. Bedside and Verbal shift change report given to Reggie Velazquez   (oncoming nurse) by Lakshmi Paulino RN   (offgoing nurse). Report included the following information SBAR, Kardex, Intake/Output, MAR and Recent Results. Left arm; 28-Sep-2021 13:58

## 2021-09-28 NOTE — ED PROVIDER NOTE - PATIENT PORTAL LINK FT
You can access the FollowMyHealth Patient Portal offered by [facility] by registering at the following website: [email or web address]. By joining [employer]’s FollowMyHealth portal, you will also be able to view your health information using other applications (apps) compatible with our system.

## 2021-09-28 NOTE — ED PROVIDER NOTE - CARE PROVIDER_API CALL
Anna Clark)  Medicine  [address], [address]  [place] [address]  Phone: [phone]  Fax: [phone]  Follow Up Time: 1-3 Days

## 2021-09-28 NOTE — ED PROVIDER NOTE - NSFOLLOWUPINSTRUCTIONS_ED_ALL_ED_FT
Dyspnea    WHAT YOU NEED TO KNOW:    Dyspnea is breathing difficulty or discomfort. You may have labored, painful, or shallow breathing. You may feel breathless or short of breath. Dyspnea can occur during rest or with activity. You may have dyspnea for a short time, or it might become chronic. Dyspnea is often a symptom of a disease or condition.    DISCHARGE INSTRUCTIONS:    Return to the emergency department if:   •Your signs and symptoms are the same or worse within 24 hours of treatment.       •You have shaking chills or a fever over 102°F.       •You have new pain, pressure, or tightness in your chest.       •You have a new or worse cough or wheezing, or you cough up blood.      •You feel like you cannot get enough air.      •The skin over your ribs or on your neck sinks in when you breathe.       •You have a severe headache with vomiting and abdominal pain.       •You feel confused or dizzy.      Call your doctor or specialist if:   •You have questions or concerns about your condition or care.          Medicines:    •Medicines may be used to treat the cause of your dyspnea. Medicines may reduce swelling in your airway or decrease extra fluid from around your heart or lungs. Other medicines may be used to decrease anxiety and help you feel calm and relaxed.      •Take your medicine as directed. Contact your healthcare provider if you think your medicine is not helping or if you have side effects. Tell him or her if you are allergic to any medicine. Keep a list of the medicines, vitamins, and herbs you take. Include the amounts, and when and why you take them. Bring the list or the pill bottles to follow-up visits. Carry your medicine list with you in case of an emergency.      Manage long-term dyspnea:   •Create an action plan. You and your healthcare provider can work together to create a plan for how to handle episodes of dyspnea. The plan can include daily activities, treatment changes, and what to do if you have severe breathing problems.      •Lean forward on your elbows when you sit. This helps your lungs expand and may make it easier to breathe.      •Use pursed-lip breathing any time you feel short of breath. Breathe in through your nose and then slowly breathe out through your mouth with your lips slightly puckered. It should take you twice as long to breathe out as it did to breathe in.  Breathe in Breathe out           •Do not smoke. Nicotine and other chemicals in cigarettes and cigars can cause lung damage and make it harder to breathe. Ask your healthcare provider for information if you currently smoke and need help to quit. E-cigarettes or smokeless tobacco still contain nicotine. Talk to your healthcare provider before you use these products.       •Reach or maintain a healthy weight. Your healthcare provider can help you create a safe weight loss plan if you are overweight.      •Exercise as directed. Exercise can help your lungs work more easily. Exercise can also help you lose weight if needed. Try to get at least 30 minutes of exercise most days of the week. Your healthcare provider can help you create an exercise plan that is safe for you.      Follow up with your doctor or specialist as directed: Write down your questions so you remember to ask them during your visits.          Urinary Tract Infection in Women    WHAT YOU NEED TO KNOW:    A urinary tract infection (UTI) is caused by bacteria that get inside your urinary tract. Most bacteria that enter your urinary tract come out when you urinate. If the bacteria stay in your urinary tract, you may get an infection. Your urinary tract includes your kidneys, ureters, bladder, and urethra. Urine is made in your kidneys, and it flows from the ureters to the bladder. Urine leaves the bladder through the urethra. A UTI is more common in your lower urinary tract, which includes your bladder and urethra.     Female Urinary System         DISCHARGE INSTRUCTIONS:    Return to the emergency department if:   •You are urinating very little or not at all.      •You have a high fever with shaking chills.       •You have side or back pain that gets worse.      Call your doctor if:   •You have a fever.      •You do not feel better after 2 days of taking antibiotics.      •You are vomiting.       •You have questions or concerns about your condition or care.      Medicines:   •Antibiotics help fight a bacterial infection. If you have UTIs often (called recurrent UTIs), you may be given antibiotics to take regularly. You will be given directions for when and how to use antibiotics. The goal is to prevent UTIs but not cause antibiotic resistance by using antibiotics too often.      •Medicines may be given to decrease pain and burning when you urinate. They will also help decrease the feeling that you need to urinate often. These medicines will make your urine orange or red.      •Take your medicine as directed. Contact your healthcare provider if you think your medicine is not helping or if you have side effects. Tell him or her if you are allergic to any medicine. Keep a list of the medicines, vitamins, and herbs you take. Include the amounts, and when and why you take them. Bring the list or the pill bottles to follow-up visits. Carry your medicine list with you in case of an emergency.      Follow up with your doctor as directed: Write down your questions so you remember to ask them during your visits.     Prevent another UTI:   •Empty your bladder often. Urinate and empty your bladder as soon as you feel the need. Do not hold your urine for long periods of time.      •Wipe from front to back after you urinate or have a bowel movement. This will help prevent germs from getting into your urinary tract through your urethra.      •Drink liquids as directed. Ask how much liquid to drink each day and which liquids are best for you. You may need to drink more liquids than usual to help flush out the bacteria. Do not drink alcohol, caffeine, or citrus juices. These can irritate your bladder and increase your symptoms. Your healthcare provider may recommend cranberry juice to help prevent a UTI.      •Urinate after you have sex. This can help flush out bacteria passed during sex.      •Do not douche or use feminine deodorants. These can change the chemical balance in your vagina.      •Change sanitary pads or tampons often. This will help prevent germs from getting into your urinary tract.       •Talk to your healthcare provider about your birth control method. You may need to change your method if it is increasing your risk for UTIs.      •Wear cotton underwear and clothes that are loose. Tight pants and nylon underwear can trap moisture and cause bacteria to grow.      •Vaginal estrogen may be recommended. This medicine helps prevent UTIs in women who have gone through menopause or are in leda-menopause.      •Do pelvic muscle exercises often. Pelvic muscle exercises may help you start and stop urinating. Strong pelvic muscles may help you empty your bladder easier. Squeeze these muscles tightly for 5 seconds like you are trying to hold back urine. Then relax for 5 seconds. Gradually work up to squeezing for 10 seconds. Do 3 sets of 15 repetitions a day, or as directed.

## 2021-10-01 LAB
CULTURE RESULTS: SIGNIFICANT CHANGE UP
SPECIMEN SOURCE: SIGNIFICANT CHANGE UP

## 2021-10-10 ENCOUNTER — EMERGENCY (EMERGENCY)
Facility: HOSPITAL | Age: 36
LOS: 0 days | Discharge: HOME | End: 2021-10-10
Attending: EMERGENCY MEDICINE
Payer: MEDICAID

## 2021-10-10 VITALS
DIASTOLIC BLOOD PRESSURE: 80 MMHG | WEIGHT: 293 LBS | TEMPERATURE: 99 F | HEART RATE: 88 BPM | SYSTOLIC BLOOD PRESSURE: 160 MMHG | RESPIRATION RATE: 20 BRPM | OXYGEN SATURATION: 98 % | HEIGHT: 64 IN

## 2021-10-10 VITALS
OXYGEN SATURATION: 100 % | HEART RATE: 85 BPM | TEMPERATURE: 98 F | RESPIRATION RATE: 20 BRPM | SYSTOLIC BLOOD PRESSURE: 109 MMHG | DIASTOLIC BLOOD PRESSURE: 76 MMHG

## 2021-10-10 DIAGNOSIS — Z86.718 PERSONAL HISTORY OF OTHER VENOUS THROMBOSIS AND EMBOLISM: ICD-10-CM

## 2021-10-10 DIAGNOSIS — M25.561 PAIN IN RIGHT KNEE: ICD-10-CM

## 2021-10-10 LAB — GLUCOSE BLDC GLUCOMTR-MCNC: 69 MG/DL — LOW (ref 70–99)

## 2021-10-10 PROCEDURE — 73562 X-RAY EXAM OF KNEE 3: CPT | Mod: 26,RT

## 2021-10-10 PROCEDURE — 99283 EMERGENCY DEPT VISIT LOW MDM: CPT

## 2021-10-10 RX ORDER — IBUPROFEN 200 MG
600 TABLET ORAL ONCE
Refills: 0 | Status: COMPLETED | OUTPATIENT
Start: 2021-10-10 | End: 2021-10-10

## 2021-10-10 RX ADMIN — Medication 600 MILLIGRAM(S): at 11:45

## 2021-10-10 NOTE — ED ADULT NURSE NOTE - OBJECTIVE STATEMENT
Pt reports R knee lateral pain since thursday. Pt was going down the steps when she felt pain. Pt unable to fully bear weight on leg, swelling in knee. Since thursday pt stays shes taken tylenol and used bengay muscle rub w/ no relief. Pain 10/10

## 2021-10-10 NOTE — ED PROVIDER NOTE - PHYSICAL EXAMINATION
Gen: Alert, Well appearing. NAD    Head: NC, AT, normal lids/conjunctiva   Pulm: Bilateral clear BS, normal resp effort  CV: RRR, no M/R/G, +dist pulses   Abd: soft, NT/ND, +BS, no guarding/rebound tenderness  Mskel: no edema/erythema/cyanosis, + rt Medial knee joint tender, pain on valgus testing. able to fully range but with pain, + DP/PT pulses. no warmth/erythema  Skin: no rash, no bruising  Neuro: AAOx3, no sensory/motor deficits,

## 2021-10-10 NOTE — ED PROVIDER NOTE - PATIENT PORTAL LINK FT
You can access the FollowMyHealth Patient Portal offered by [facility] by registering at the following website: [email or web address]. By joining [employer]’s FollowMyHealth portal, you will also be able to view your health information using other applications (apps) compatible with our system.

## 2021-10-10 NOTE — ED PROVIDER NOTE - NSFOLLOWUPINSTRUCTIONS_ED_ALL_ED_FT
Follow up with your primary care doctor within the next 24-48 hours and bring copy of your results.  Return to the Emergency Department for worsening or persistent symptoms or any other concerns incl. chest pain, shortness of breath, dizziness, inability to tolerate oral intake.  Rest, drink plenty of fluids.  Advance activity as tolerated.  Continue all previously prescribed medications as directed. You can use Tylenol 650 mg every 4 hours for pain/fever (Max 4g/day of tylenol)

## 2021-10-10 NOTE — ED PROVIDER NOTE - CARE PROVIDER_API CALL
Cl Jernigan (DO)  Orthopaedic Surgery Surgery  [address] [facility], [address] [place] [address]  Phone: [phone]  Fax: [phone]  Follow Up Time:

## 2021-10-10 NOTE — ED ADULT NURSE NOTE - NSIMPLEMENTINTERV_GEN_ALL_ED
Implemented All Universal Safety Interventions:  [place] to call system. Call bell, personal items and telephone within reach. Instruct patient to call for assistance. Room bathroom lighting operational. Non-slip footwear when patient is off stretcher. Physically safe environment: no spills, clutter or unnecessary equipment. Stretcher in lowest position, wheels locked, appropriate side rails in place.

## 2021-10-10 NOTE — ED PROVIDER NOTE - MDM ORDERS SUBMITTED SELECTION
Patient is a 65 year old female with pmhx: Of: Umbical hernia, Restless legs syndrome, GERD, Fibromyalgia, Morbidly obese, Environmental allergies, Depression, Diarrhea, Constipation, RENARD, Osteoarthritis of knee, High triglycerides HLD, T2DM, Hypothyroidism, admitted for cellulitis.
Imaging Studies/Medications

## 2021-10-13 RX ORDER — ISOPROPYL ALCOHOL, BENZOCAINE .7; .06 ML/ML; ML/ML
1 SWAB TOPICAL
Qty: 120 | Refills: 1
Start: 2021-10-13 | End: 2021-12-11

## 2021-10-15 NOTE — REASON FOR VISIT
[FreeTextEntry2] : DVT/Protein S deficiency.  This is the patient's first with this provider team.  She is transferred to our care after Dr. Aida Yeboah has left the practice.

## 2021-10-15 NOTE — HISTORY OF PRESENT ILLNESS
[de-identified] : Nov 19, 2019\par 34 year-old female with PMH of unprovoked left popliteal vein DVT presented today to discuss about current blood thinner use before the surgery. She has been on Eliquis 5mg BID for 1 year, but stopped using it 2 weeks ago by herself. Patient recently visited ED with abdominal pain and was found to have cholelithiasis for which surgeon recommended cholecystectomy. For the DVT, 1 year ago patient had sudden onset left leg pain with cramp that persisted for a few days, she visited ED and was diagnosed with acute DVT. That time she did not travel long distance, take OCP, have cancer, or have sedentary life style. 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 Not a smoker, denied using illicit drug or EtOH. No family history of autoimmune disorder/malignancy/blood disorder.\par Patient was evaluated by a rheumatologist in May given a history of 2 times of 1st trimester miscarriage and DVT. Her miscarriages were all before 10 weeks of pregnancy. Work-ups were done and showed +SOPHY at 1:640 speckled, +SSA Ab, and negative DS-DNA, anti Helton, B2 GP, lupus anticoagulant, and anticardiolipin.\par Patient currently denies having any symptoms such as leg pain, or chest pain. Of note, her recent lab showed Hb of 10, but she was not aware of her anemia. Pt does have intermittent dyspnea on exertion and fatigue.  [de-identified] : n/a [de-identified] : DVT- spontaneous [FreeTextEntry1] : eliquis px [de-identified] : Since her last visit with Dr. Aida Yeboah in 2019, she has been generally felling well and following up with her primary physician.\par She was ill at home with Coronavirus last year April 2020, the only symptoms she experienced were taste change and loss of smell\par \par She is s/p left shoulder surgery last year, getting PT \par She an out of Eliquis around Jan/Feb\par Started drinking Vervain tea daily over 4 mos ago\par SOB at lying flat \par No chest pain or palpitations

## 2021-10-15 NOTE — ASSESSMENT
[FreeTextEntry1] : Coleen Hagan is  a patient previously seen by Dr Shimno Yeboah who has left the hematology service in January 2021. The patient was previously on Eliquis for periods of time exceeding one year. Prior laboratory findings had included low levels of Protein S. The patient has risk factors which provoke thrombosis including venous incompetence and obesity. She has been off anticoagulation for six months; she had run out of Eliquis and she did not have a renewal.\par \par Resume Eliquis 2.5 mg PO BID . \par F/U with pulmonologist and vascular.\par She has arranged appointment with nutritionist for help with management of obesity. \par RTO 2 months. \par Seen with Dr. Vick\par \par

## 2021-11-09 ENCOUNTER — APPOINTMENT (OUTPATIENT)
Dept: PULMONOLOGY | Facility: CLINIC | Age: 36
End: 2021-11-09
Payer: MEDICAID

## 2021-11-09 VITALS
SYSTOLIC BLOOD PRESSURE: 118 MMHG | BODY MASS INDEX: 45.99 KG/M2 | WEIGHT: 293 LBS | DIASTOLIC BLOOD PRESSURE: 81 MMHG | TEMPERATURE: 97 F | HEIGHT: 67.06 IN | HEART RATE: 86 BPM

## 2021-11-09 DIAGNOSIS — R06.02 SHORTNESS OF BREATH: ICD-10-CM

## 2021-11-09 PROCEDURE — 99203 OFFICE O/P NEW LOW 30 MIN: CPT

## 2021-11-09 NOTE — PHYSICAL EXAM
[No Acute Distress] : no acute distress [Normal Appearance] : normal appearance [Normal Rate/Rhythm] : normal rate/rhythm [Normal S1, S2] : normal s1, s2 [No Murmurs] : no murmurs [No Resp Distress] : no resp distress [Clear to Auscultation Bilaterally] : clear to auscultation bilaterally [Benign] : benign [Normal Gait] : normal gait [No Edema] : no edema [Normal Color/ Pigmentation] : normal color/ pigmentation [No Focal Deficits] : no focal deficits [Oriented x3] : oriented x3 [Normal Affect] : normal affect

## 2021-11-09 NOTE — HISTORY OF PRESENT ILLNESS
[TextBox_4] : The patient is a 36 year old female with no pulmonary history, non smoker who presents to clinic for evaluation of SOB. The patient reports that in August 2021 she developed shortness of breath both at rest and on ambulation. She first went to her primary care doctor who referred her to us for evaluation. A few weeks later she went to the ED with SOB and had a CTPA which showed no PE and clear lungs. After this, in September patient went to an urgent with shortness of breath at rest. No wheezing, cough or fever at that time. She was diagnosed with bronchitis at that time and was prescribed azithromycin, prednisone for 5 days and given an inhaler. She has only used the inhaler 2 times since receiving it in September. \par \par She reports she has not had shortness of breath today. She states her last episode of shortness of breath was yesterday when she was drinking some cold water. She reports immediately after drinking the cold water she felt very short of breath and had to sit down for almost an hour trying to "catch" her breath. She does not get SOB with walking or going up the stairs. She reports these shortness of breath episodes coinciding with eating or drinking. \par \par She reports a history of acid reflux. She has not an episode of reflux in the last 7 years. She is not taking a medication for acid reflux currently. \par \par She reports that she is not currently taking her apixaban. She has stopped it 5 days ago because she is taking some "bitters" to clean her system out. The bitters are called "sersei." \par \par Of note, patient had COVID 04/2020.

## 2021-11-09 NOTE — CONSULT LETTER
[Dear  ___] : Dear  [unfilled], [Consult Letter:] : I had the pleasure of evaluating your patient, [unfilled]. [FreeTextEntry2] : Brit Milton MD\par [address]\[place]  [address]\par [phone]

## 2021-11-09 NOTE — ASSESSMENT
[FreeTextEntry1] : The patient is a 36 year old female with no pulmonary history, non smoker who presents to clinic for evaluation of SOB. The patient reports that in August 2021 she developed shortness of breath both at rest and on ambulation. She first went to her primary care doctor who referred her to us for evaluation. A few weeks later she went to the ED with SOB and had a CTPA which showed no PE and clear lungs. After this, in September patient went to an urgent with shortness of breath at rest. No wheezing, cough or fever at that time. She was diagnosed with bronchitis at that time and was prescribed azithromycin, prednisone for 5 days and given an inhaler. She has only used the inhaler 2 times since receiving it in September. \par \par Assessment: Patient has clear lungs on exam and did not desaturate on ambulatory oxygen test today (lowest sat 96%). She has an elevated BMI and reports she is currently deconditioned since she has not been exercising regularly. Will need to get PFTs.\par \par Plan:\par PFTs ordered\par \par Instructed patient directly that she should take her apixaban daily as directed and that she should avoid skipping doses. \par \par Abnormal CT chest\par CT chest 09/28/21 reviewed, patient noted to have enlarged bilateral axillary and subpectoral nodes.. Right subpectoral node (series 8, image 40), measuring 2.1 x 1.6 cm. These findings are new. We discussed them with the patient. Patient reports receiving first dose of vaccine early 09/2021. It is possible these lymph nodes are reactive however they will need to be followed. Patient instructed to follow up with her PCP Brit Milton md [place]. We will contact Dr Milton to ensure she receives the message about these findings as additional imaging and close follow up is indicated. Patient has never had a mammogram based on her age. \par \par F/U after PFTs. \par

## 2021-11-09 NOTE — REVIEW OF SYSTEMS
[Postnasal Drip] : postnasal drip [GERD] : gerd [Fever] : no fever [Recent Wt Gain (___ Lbs)] : ~T no recent weight gain [Chills] : no chills [Recent Wt Loss (___ Lbs)] : ~T no recent weight loss [Sore Throat] : no sore throat [Nasal Congestion] : no nasal congestion [Dry Mouth] : no dry mouth [Sinus Problems] : no sinus problems [Cough] : no cough [Hemoptysis] : no hemoptysis [Chest Tightness] : no chest tightness [Sputum] : no sputum [Dyspnea] : no dyspnea [Wheezing] : no wheezing [SOB on Exertion] : no sob on exertion [Chest Discomfort] : no chest discomfort [Palpitations] : no palpitations [Abdominal Pain] : no abdominal pain [Nausea] : no nausea [Vomiting] : no vomiting [Diarrhea] : no diarrhea [Arthralgias] : no arthralgias [Rash] : no rash [Headache] : no headache

## 2021-11-29 ENCOUNTER — OUTPATIENT (OUTPATIENT)
Dept: OUTPATIENT SERVICES | Facility: HOSPITAL | Age: 36
LOS: 1 days | Discharge: HOME | End: 2021-11-29

## 2021-11-29 DIAGNOSIS — I82.409 ACUTE EMBOLISM AND THROMBOSIS OF UNSPECIFIED DEEP VEINS OF UNSPECIFIED LOWER EXTREMITY: ICD-10-CM

## 2021-11-30 ENCOUNTER — APPOINTMENT (OUTPATIENT)
Dept: HEMATOLOGY ONCOLOGY | Facility: CLINIC | Age: 36
End: 2021-11-30

## 2021-12-14 ENCOUNTER — APPOINTMENT (OUTPATIENT)
Dept: PULMONOLOGY | Facility: CLINIC | Age: 36
End: 2021-12-14
Payer: MEDICAID

## 2021-12-14 VITALS
TEMPERATURE: 97.8 F | OXYGEN SATURATION: 98 % | WEIGHT: 293 LBS | BODY MASS INDEX: 45.45 KG/M2 | HEIGHT: 67.3 IN | HEART RATE: 82 BPM

## 2021-12-14 PROCEDURE — 94010 BREATHING CAPACITY TEST: CPT

## 2021-12-14 PROCEDURE — 94726 PLETHYSMOGRAPHY LUNG VOLUMES: CPT

## 2021-12-14 PROCEDURE — 94729 DIFFUSING CAPACITY: CPT

## 2022-01-03 PROBLEM — D64.9 ANEMIA, UNSPECIFIED TYPE: Status: ACTIVE | Noted: 2019-11-19

## 2022-01-03 PROBLEM — I82.432 ACUTE DEEP VEIN THROMBOSIS (DVT) OF POPLITEAL VEIN OF LEFT LOWER EXTREMITY: Status: ACTIVE | Noted: 2018-12-10

## 2022-01-07 ENCOUNTER — OUTPATIENT (OUTPATIENT)
Dept: OUTPATIENT SERVICES | Facility: HOSPITAL | Age: 37
LOS: 1 days | Discharge: HOME | End: 2022-01-07

## 2022-01-07 DIAGNOSIS — I82.409 ACUTE EMBOLISM AND THROMBOSIS OF UNSPECIFIED DEEP VEINS OF UNSPECIFIED LOWER EXTREMITY: ICD-10-CM

## 2022-01-10 ENCOUNTER — APPOINTMENT (OUTPATIENT)
Dept: HEMATOLOGY ONCOLOGY | Facility: CLINIC | Age: 37
End: 2022-01-10
Payer: MEDICAID

## 2022-01-10 DIAGNOSIS — D68.59 OTHER PRIMARY THROMBOPHILIA: ICD-10-CM

## 2022-01-10 DIAGNOSIS — D64.9 ANEMIA, UNSPECIFIED: ICD-10-CM

## 2022-01-10 DIAGNOSIS — I82.432 ACUTE EMBOLISM AND THROMBOSIS OF LEFT POPLITEAL VEIN: ICD-10-CM

## 2022-01-10 PROCEDURE — 99214 OFFICE O/P EST MOD 30 MIN: CPT | Mod: 95

## 2022-01-10 RX ORDER — APIXABAN 2.5 MG/1
2.5 TABLET, FILM COATED ORAL
Qty: 60 | Refills: 6 | Status: ACTIVE | COMMUNITY
Start: 2019-12-19 | End: 1900-01-01

## 2022-01-10 NOTE — OB HISTORY
[Definite:  ___ (Date)] : the last menstrual period was [unfilled] [Normal Amount/Duration] : was of a normal amount and duration [Spotting Between  Menses] : spotting reported between menses [Regular Cycle Intervals] : periods have been regular

## 2022-01-12 NOTE — HISTORY OF PRESENT ILLNESS
[Disease:__________________________] : Disease: [unfilled] [Treatment Protocol] : Treatment Protocol [Home] : at home, [unfilled] , at the time of the visit. [Medical Office: ([facility])___] : at the medical office located in  [Verbal consent obtained from patient] : the patient, [unfilled] [Date: ____________] : Patient's last distress assessment performed on [unfilled]. [de-identified] : Nov 19, 2019\par 34 year-old female with PMH of unprovoked left popliteal vein DVT presented today to discuss about current blood thinner use before the surgery. She has been on Eliquis 5mg BID for 1 year, but stopped using it 2 weeks ago by herself. Patient recently visited ED with abdominal pain and was found to have cholelithiasis for which surgeon recommended cholecystectomy. For the DVT, 1 year ago patient had sudden onset left leg pain with cramp that persisted for a few days, she visited ED and was diagnosed with acute DVT. That time she did not travel long distance, take OCP, have cancer, or have sedentary life style. 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 Not a smoker, denied using illicit drug or EtOH. No family history of autoimmune disorder/malignancy/blood disorder.\par Patient was evaluated by a rheumatologist in May given a history of 2 times of 1st trimester miscarriage and DVT. Her miscarriages were all before 10 weeks of pregnancy. Work-ups were done and showed +SOPHY at 1:640 speckled, +SSA Ab, and negative DS-DNA, anti Helton, B2 GP, lupus anticoagulant, and anticardiolipin.\par Patient currently denies having any symptoms such as leg pain, or chest pain. Of note, her recent lab showed Hb of 10, but she was not aware of her anemia. Pt does have intermittent dyspnea on exertion and fatigue. \par 09/21/2021 first visit with Dr Vick as single vist.\par julissa remains on Eliquis treatment for history of lower extremity DVT in 2019; no health issues except for outpatient treatment for bronchitis; note the use of steroids and brochodilators [de-identified] : n/a [de-identified] : DVT- spontaneous [FreeTextEntry1] : eliquis px [de-identified] : The patient has been feeling well. No bleeding and no clotting in abnormal sites while in Eliquis 2.5 mg PO BID

## 2022-01-12 NOTE — PHYSICAL EXAM
[Fully active, able to carry on all pre-disease performance without restriction] : Status 0 - Fully active, able to carry on all pre-disease performance without restriction [Normal] : affect appropriate [de-identified] : as seen [de-identified] : as seen [de-identified] : as seen [de-identified] : as seen [de-identified] : as seen [de-identified] : as seen [de-identified] : as seen [de-identified] : as  seen [de-identified] : as seen [de-identified] : as seen [de-identified] : as seen [de-identified] : as seen

## 2022-01-12 NOTE — ASSESSMENT
[Supportive] : Goals of care discussed with patient: Supportive [Palliative Care Plan] : not applicable at this time [FreeTextEntry1] : Coleen Hagan is  a patient previously seen by Dr Shimon Yeboah who has left the hematology service in January 2021. The patient was previously on Eliquis for periods of time exceeding one year. Prior laboratory findings had included low levels of Protein S. The patient has risk factors which provoke thrombosis including venous incompetence and obesity. She has been off anticoagulation for six months; she had run out of Eliquis and she did not have a renewal.\par Resume Eliquis 2.5 mg PO BID in July 2021 . \par F/U with pulmonologist and vascular.\par She has arranged appointment with nutritionist for help with management of obesity. \par \par 09/21/2021; she has only lost a few pounds. One episode of shortness of breath treated with inhaler and broncho dilator at walk in  [facility]; she decided not to go to Emergency Room service.\par Physical examination today unchanged form July 2021; no wheezing or rales and no dullness o chest examination. Good pulse oximetry and no tachypnea\par recheck Protein S level and activated protein C level. There is no family history of thrombosis so if protein S deficiency exist (prior record in 2019 was 50%)\par This is her first single physician visit at [facility] since December 2019; 22 months during which time she had stopped Eliquis\par RTC 8 weeks\par \par 01/10/2022\par No change in current status as she is being treated with DOAC apixiban for documented low levels of Protein S. She is continuing on the Eliquis and has not had complication of treatment. She has had vaccination against SARS novel [place] V 2\par \par

## 2022-02-14 NOTE — ED ADULT NURSE NOTE - NSFALLRSKASSESSTYPE_ED_ALL_ED
How Severe Is Your Acne?: mild
Is This A New Presentation, Or A Follow-Up?: Acne
What Type Of Note Output Would You Prefer (Optional)?: Standard Output
Initial (On Arrival)

## 2022-02-19 ENCOUNTER — EMERGENCY (EMERGENCY)
Facility: HOSPITAL | Age: 37
LOS: 1 days | Discharge: HOME | End: 2022-02-19
Attending: EMERGENCY MEDICINE
Payer: MEDICAID

## 2022-02-19 VITALS
DIASTOLIC BLOOD PRESSURE: 83 MMHG | TEMPERATURE: 98 F | HEART RATE: 84 BPM | RESPIRATION RATE: 18 BRPM | OXYGEN SATURATION: 98 % | SYSTOLIC BLOOD PRESSURE: 114 MMHG | WEIGHT: 289.91 LBS

## 2022-02-19 VITALS
OXYGEN SATURATION: 98 % | HEART RATE: 87 BPM | DIASTOLIC BLOOD PRESSURE: 68 MMHG | RESPIRATION RATE: 17 BRPM | SYSTOLIC BLOOD PRESSURE: 117 MMHG

## 2022-02-19 LAB
ALBUMIN SERPL ELPH-MCNC: 2.9 G/DL — LOW (ref 3.5–5)
ALP SERPL-CCNC: 75 U/L — SIGNIFICANT CHANGE UP (ref 40–120)
ALT FLD-CCNC: 18 U/L DA — SIGNIFICANT CHANGE UP (ref 10–60)
ANION GAP SERPL CALC-SCNC: 4 MMOL/L — LOW (ref 5–17)
APPEARANCE UR: CLEAR — SIGNIFICANT CHANGE UP
AST SERPL-CCNC: 16 U/L — SIGNIFICANT CHANGE UP (ref 10–40)
BASOPHILS # BLD AUTO: 0.02 K/UL — SIGNIFICANT CHANGE UP (ref 0–0.2)
BASOPHILS NFR BLD AUTO: 0.4 % — SIGNIFICANT CHANGE UP (ref 0–2)
BILIRUB SERPL-MCNC: 0.3 MG/DL — SIGNIFICANT CHANGE UP (ref 0.2–1.2)
BILIRUB UR-MCNC: NEGATIVE — SIGNIFICANT CHANGE UP
BUN SERPL-MCNC: 8 MG/DL — SIGNIFICANT CHANGE UP (ref 7–18)
CALCIUM SERPL-MCNC: 8 MG/DL — LOW (ref 8.4–10.5)
CHLORIDE SERPL-SCNC: 107 MMOL/L — SIGNIFICANT CHANGE UP (ref 96–108)
CO2 SERPL-SCNC: 28 MMOL/L — SIGNIFICANT CHANGE UP (ref 22–31)
COLOR SPEC: YELLOW — SIGNIFICANT CHANGE UP
CREAT SERPL-MCNC: 0.76 MG/DL — SIGNIFICANT CHANGE UP (ref 0.5–1.3)
D DIMER BLD IA.RAPID-MCNC: 198 NG/ML DDU — SIGNIFICANT CHANGE UP
DIFF PNL FLD: ABNORMAL
EOSINOPHIL # BLD AUTO: 0.07 K/UL — SIGNIFICANT CHANGE UP (ref 0–0.5)
EOSINOPHIL NFR BLD AUTO: 1.5 % — SIGNIFICANT CHANGE UP (ref 0–6)
GLUCOSE SERPL-MCNC: 89 MG/DL — SIGNIFICANT CHANGE UP (ref 70–99)
GLUCOSE UR QL: NEGATIVE — SIGNIFICANT CHANGE UP
HCG SERPL-ACNC: <1 MIU/ML — SIGNIFICANT CHANGE UP
HCT VFR BLD CALC: 33.2 % — LOW (ref 34.5–45)
HGB BLD-MCNC: 11 G/DL — LOW (ref 11.5–15.5)
IMM GRANULOCYTES NFR BLD AUTO: 0 % — SIGNIFICANT CHANGE UP (ref 0–1.5)
KETONES UR-MCNC: NEGATIVE — SIGNIFICANT CHANGE UP
LEUKOCYTE ESTERASE UR-ACNC: ABNORMAL
LIDOCAIN IGE QN: 87 U/L — SIGNIFICANT CHANGE UP (ref 73–393)
LYMPHOCYTES # BLD AUTO: 1.6 K/UL — SIGNIFICANT CHANGE UP (ref 1–3.3)
LYMPHOCYTES # BLD AUTO: 34.8 % — SIGNIFICANT CHANGE UP (ref 13–44)
MCHC RBC-ENTMCNC: 28.4 PG — SIGNIFICANT CHANGE UP (ref 27–34)
MCHC RBC-ENTMCNC: 33.1 GM/DL — SIGNIFICANT CHANGE UP (ref 32–36)
MCV RBC AUTO: 85.6 FL — SIGNIFICANT CHANGE UP (ref 80–100)
MONOCYTES # BLD AUTO: 0.51 K/UL — SIGNIFICANT CHANGE UP (ref 0–0.9)
MONOCYTES NFR BLD AUTO: 11.1 % — SIGNIFICANT CHANGE UP (ref 2–14)
NEUTROPHILS # BLD AUTO: 2.4 K/UL — SIGNIFICANT CHANGE UP (ref 1.8–7.4)
NEUTROPHILS NFR BLD AUTO: 52.2 % — SIGNIFICANT CHANGE UP (ref 43–77)
NITRITE UR-MCNC: NEGATIVE — SIGNIFICANT CHANGE UP
NRBC # BLD: 0 /100 WBCS — SIGNIFICANT CHANGE UP (ref 0–0)
PH UR: 8 — SIGNIFICANT CHANGE UP (ref 5–8)
PLATELET # BLD AUTO: 283 K/UL — SIGNIFICANT CHANGE UP (ref 150–400)
POTASSIUM SERPL-MCNC: 3.6 MMOL/L — SIGNIFICANT CHANGE UP (ref 3.5–5.3)
POTASSIUM SERPL-SCNC: 3.6 MMOL/L — SIGNIFICANT CHANGE UP (ref 3.5–5.3)
PROT SERPL-MCNC: 8 G/DL — SIGNIFICANT CHANGE UP (ref 6–8.3)
PROT UR-MCNC: 15
RBC # BLD: 3.88 M/UL — SIGNIFICANT CHANGE UP (ref 3.8–5.2)
RBC # FLD: 14.6 % — HIGH (ref 10.3–14.5)
SARS-COV-2 RNA SPEC QL NAA+PROBE: SIGNIFICANT CHANGE UP
SODIUM SERPL-SCNC: 139 MMOL/L — SIGNIFICANT CHANGE UP (ref 135–145)
SP GR SPEC: 1.01 — SIGNIFICANT CHANGE UP (ref 1.01–1.02)
TROPONIN I, HIGH SENSITIVITY RESULT: 3.9 NG/L — SIGNIFICANT CHANGE UP
UROBILINOGEN FLD QL: NEGATIVE — SIGNIFICANT CHANGE UP
WBC # BLD: 4.6 K/UL — SIGNIFICANT CHANGE UP (ref 3.8–10.5)
WBC # FLD AUTO: 4.6 K/UL — SIGNIFICANT CHANGE UP (ref 3.8–10.5)

## 2022-02-19 PROCEDURE — 93005 ELECTROCARDIOGRAM TRACING: CPT

## 2022-02-19 PROCEDURE — 99284 EMERGENCY DEPT VISIT MOD MDM: CPT | Mod: 25

## 2022-02-19 PROCEDURE — 81001 URINALYSIS AUTO W/SCOPE: CPT

## 2022-02-19 PROCEDURE — 99284 EMERGENCY DEPT VISIT MOD MDM: CPT

## 2022-02-19 PROCEDURE — 80053 COMPREHEN METABOLIC PANEL: CPT

## 2022-02-19 PROCEDURE — 87635 SARS-COV-2 COVID-19 AMP PRB: CPT

## 2022-02-19 PROCEDURE — 36415 COLL VENOUS BLD VENIPUNCTURE: CPT

## 2022-02-19 PROCEDURE — 96374 THER/PROPH/DIAG INJ IV PUSH: CPT

## 2022-02-19 PROCEDURE — 85025 COMPLETE CBC W/AUTO DIFF WBC: CPT

## 2022-02-19 PROCEDURE — 93010 ELECTROCARDIOGRAM REPORT: CPT

## 2022-02-19 PROCEDURE — 84484 ASSAY OF TROPONIN QUANT: CPT

## 2022-02-19 PROCEDURE — 85379 FIBRIN DEGRADATION QUANT: CPT

## 2022-02-19 PROCEDURE — 83690 ASSAY OF LIPASE: CPT

## 2022-02-19 PROCEDURE — 84702 CHORIONIC GONADOTROPIN TEST: CPT

## 2022-02-19 RX ORDER — FAMOTIDINE 10 MG/ML
20 INJECTION INTRAVENOUS ONCE
Refills: 0 | Status: COMPLETED | OUTPATIENT
Start: 2022-02-19 | End: 2022-02-19

## 2022-02-19 RX ADMIN — Medication 30 MILLILITER(S): at 15:10

## 2022-02-19 RX ADMIN — FAMOTIDINE 20 MILLIGRAM(S): 10 INJECTION INTRAVENOUS at 15:10

## 2022-02-19 NOTE — ED PROVIDER NOTE - ATTESTATION, MLM
I have reviewed and confirmed nurses' notes for patient's medications, allergies, medical history, and surgical history. (0) independent

## 2022-02-19 NOTE — ED PROVIDER NOTE - PROGRESS NOTE DETAILS
feeling better after tx and wants to go home, reviewed case and results w pt, questions answered and pt understands, advised return precautions and care plan. pt states her personal medical doctor prescribed omeprazole

## 2022-02-19 NOTE — ED PROVIDER NOTE - CARE PROVIDER_API CALL
Frederic Sousa)  Medicine  [address] [place] [address]  Phone: [phone]  Fax: [phone]  Follow Up Time: 7-10 Days

## 2022-02-19 NOTE — ED PROVIDER NOTE - NSFOLLOWUPINSTRUCTIONS_ED_ALL_ED_FT
IMPORTANT INSTRUCTIONS FROM Dr. PAGE:    Please follow up with your personal medical doctor in 24-48 hours.   Bring results from today to your visit.    If you were advised to take any medications - be sure to review the package insert.    If your symptoms change, get worse or if you have any new symptoms, come to the ER right away.  If you have any questions, call the ER at the phone number on this page.

## 2022-02-19 NOTE — ED PROVIDER NOTE - OBJECTIVE STATEMENT
36 year old female with past medical history of deep vein thrombosis on Eliquis presents to the ED with complaints of chest pain today. The patient states that the pain goes to her stomach and radiates to her mouth. She reports that she needed a biopsy of her right arm and needed to stop taking the Eliquis for two weeks. The patient states that she was supposed to start taking her Eliquis again but has not. The patient denies any shortness of breath, pleuritic symptoms, fever, cough, abdominal pain, diarrhea, or UTI symptoms. NKDA.

## 2022-02-19 NOTE — ED PROVIDER NOTE - CLINICAL SUMMARY MEDICAL DECISION MAKING FREE TEXT BOX
Symptoms are not consistent with an abdominal process given the patient's benign abdominal exam. Will obtain basic blood work, including cardiac enzymes and d-dimer in the context of the patient's known deep vein thrombosis and abrupt cessation of medications a few weeks ago. Will provide symptomatic treatment and reassess.

## 2022-02-19 NOTE — ED PROVIDER NOTE - PATIENT PORTAL LINK FT
You can access the FollowMyHealth Patient Portal offered by [facility] by registering at the following website: [email or web address]. By joining [facility]’s FollowMyHealth portal, you will also be able to view your health information using other applications (apps) compatible with our system.

## 2022-04-26 NOTE — ED ADULT NURSE NOTE - PT NEEDS ASSIST
[facility] EMERGENCY DEPARTMENT ENCOUNTER    Patient : Amparo Calix Age: 57 year old Sex: female   MRN: [id number] Encounter Date: 4/26/2022  PCP: Nallely Reyes MD      CHIEF COMPLAINT    Chief Complaint   Patient presents with   • Leg Swelling       History of Present Illness     Amparo Calix is a 57 year old female who presents to the ED with chief complaint of left calf pain.  Pt was at work today.  She stood up to go to lunch and her left calf \"felt funny.\"  Shortly after, she noticed that her left pant leg felt tighter on her left calf than the right.  Pt looked at her leg and the left lower leg appeared swollen and slightly larger than the right.  Pt went to [facility] for evaluation and was sent to ER for US to r/o DVT.  Pt denies any previous injury, trauma or overexertion.  She denies any recent travel, surgery, or immobilization.  Pt denies any hx of cancer or smoking or previous blood clots.  She has had no fevers, chills, cough, sob, cp, abd pain, n/v/d/c, or other acute complaints. Pt states that now her left leg appears to be back to its normal size.        Health Status     ALLERGIES:   Allergen Reactions   • Dog Dander Other (See Comments)   • Dust Other (See Comments)   • Grass Other (See Comments)       Prior to Admission Medications    ALBUTEROL 108 (90 BASE) MCG/ACT INHALER    INHALE 2 PUFFS BY MOUTH EVERY 4 HOURS AS NEEDED FOR WHEEZE OR SHORTNESS OF BREATH    BUPROPION XL (WELLBUTRIN XL) 300 MG 24 HR TABLET    Take 300 mg by mouth daily. Take with 150 mg tablet (total dose 450 mg daily)    ESTRADIOL (VAGIFEM) 10 MCG VAGINAL TABLET    Place 100 mcg vaginally 3 days a week. Monday Thursday and Saturday    FLUTICASONE (FLONASE) 50 MCG/ACT NASAL SPRAY    SPRAY 1 SPRAY IN EACH NOSTRIL 2 TIMES DAILY.    LEVOTHYROXINE 75 MCG TABLET    TAKE 1 TABLET BY MOUTH DAILY. PLEASE SCHEDULE A PHYSICAL FOR FUTURE REFILLS    LISDEXAMFETAMINE (VYVANSE) 70 MG CAPSULE    Take 70 mg by mouth every morning.     no LORAZEPAM (ATIVAN) 0.5 MG TABLET    Take 0.5 mg by mouth 2 times daily.     MONTELUKAST (SINGULAIR) 10 MG TABLET    TAKE 1 TABLET BY MOUTH EVERY DAY AT NIGHT    MULTIPLE VITAMINS-MINERALS (MULTIVITAMIN ADULT PO)    Take 1 tablet by mouth daily.    PLECANATIDE (TRULANCE) 3 MG TABLET    Take 3 mg by mouth daily.    SUMATRIPTAN (IMITREX) 100 MG TABLET    TAKE 1 TABLET BY MOUTH EVERY DAY    TRAZODONE (DESYREL) 150 MG TABLET    Take 150 mg by mouth nightly.        New Prescriptions    No medications on file       Past Medical History     Past Medical History:   Diagnosis Date   • Anxiety    • Asthma    • Colonic inertia    • Depression    • H/O colonoscopy 09/04/2019    f/u 10 yrs   • H/O mammogram 08/22/2020   • Hypothyroidism    • Migraine without aura, with intractable migraine, so stated, with status migrainosus    • S/P catheter ablation of slow pathway 12/18/2020   • SVT (supraventricular tachycardia) (CMS/HCC)        Past Surgical History:   Procedure Laterality Date   • Cardiac catherization     • Cardiac electrophysiology mapping and ablation  12/18/2020   • Cardiac electrophysiology study and ablation     • Hemangioma excision         Family History   Problem Relation Age of Onset   • Hypertension Mother    • Thyroid Mother    • Depression Son    • Stroke/TIA Father    • Diabetes Brother    • Thyroid Daughter        Social History     Tobacco Use   • Smoking status: Never Smoker   • Smokeless tobacco: Never Used   Vaping Use   • Vaping Use: Not on file   Substance Use Topics   • Alcohol use: Yes   • Drug use: Never         Review of Systems      All systems otherwise negative, ROS reviewed as documented in chart.      Physical Exam     ED Triage Vitals [04/25/22 1940]   /85   Heart Rate 70   Resp 16   Temp 99 °F (37.2 °C)   SpO2 98 %       Physical Exam    ED Triage Vitals [04/25/22 1940]   /85   Heart Rate 70   Resp 16   Temp 99 °F (37.2 °C)   SpO2 98 %      General:  Alert. No acute distress.     Skin:  Warm. Intact.    Head:  Normocephalic. Atraumatic.    Neck:  Supple.   Eye:  EOMI. Normal conjunctiva.  ENMT: Oral mucosa moist.  Cardiovascular:  Normal peripheral perfusion. Heart regular rate and rhythm.  Respiratory:  Respirations are non-labored. Lungs CTA.  No respiratory distress.  Abdominal:  Nontender.  Nondistended.  No guarding or rebound tenderness.  Musculoskeletal:  No swelling, erythema, warmth, or tenderness to the left calf.  No bony ttp of left foot, ankle, lower leg, knee, thigh, or hip.  Normal ROM and strength throughout LLE.  +2 DP pulse.  Neurological:  A/O x 4.  No cranial nerve deficits.  Psychiatric:  Cooperative. Appropriate mood & affect.      Medical Decision Making     Rationale:  Multiple differential diagnoses were considered. The patient / caregivers were apprised of diagnostic / treatment options including alternate modes of care, in addition to the risks and benefits, for this medical condition. Based on this discussion the patient / caregiver agrees with this chosen diagnostic and treatment plan.    Orders   Medications - No data to display        LABORATORY RESULTS    Results for orders placed or performed during the hospital encounter of 04/26/22   Comprehensive Metabolic Panel   Result Value    Fasting Status     Sodium 141    Potassium 4.0    Chloride 109 (H)    Carbon Dioxide 29    Anion Gap 7 (L)    Glucose 97    BUN 15    Creatinine 0.77    Glomerular Filtration Rate 89     Comment: eGFR results = or >60 mL/min/1.73m2 = Normal kidney function. Estimated GFR calculated using the CKD-EPI-R (2021) equation that does not include race in the creatinine calculation.    BUN/ Creatinine Ratio 19    Calcium 9.3    Bilirubin, Total 0.2    GOT/AST 18    GPT/ALT 25    Alkaline Phosphatase 78    Albumin 4.0    Protein, Total 7.2    Globulin 3.2    A/G Ratio 1.3   CBC with Automated Differential (performable only)   Result Value    WBC 7.6    RBC 4.05    HGB 12.4    HCT 38.0     MCV 93.8    MCH 30.6    MCHC 32.6    RDW-CV 13.1    RDW-SD 45.1        NRBC 0    Neutrophil, Percent 42    Lymphocytes, Percent 48    Mono, Percent 8    Eosinophils, Percent 1    Basophils, Percent 1    Immature Granulocytes 0    Absolute Neutrophils 3.2    Absolute Lymphocytes 3.7    Absolute Monocytes 0.6    Absolute Eosinophils  0.1    Absolute Basophils 0.1    Absolute Immmature Granulocytes 0.0         RADIOLOGY RESULTS  US VASC LOWER EXTREMITY VENOUS DUPLEX LEFT    (Results Pending)     Preliminary Findings Only -- See Final Report For Complete Findings   US VENOUS LEFT LOWER EXTREMITY:    IMPRESSION: No evidence of deep venous thrombosis of the left lower extremity.    Normal flow, compression, and augmentation in the left common femoral, superficial femoral, and popliteal veins.  Radiologist: Freddy Downey M.D. Phone: [phone]     Study ready at 01:12 and initial results transmitted at 01:20   Results also transmitted to  @ [phone] (Fax)         MDM:      Multiple differential diagnoses were considered. The patient / caregivers were apprised of diagnostic / treatment options including alternate modes of care, in addition to risks and benefits, for this medical condition. Based on this discussion the patient / caregiver agrees with this chosen diagnostic and treatment plan.    ED Course     Vitals:    04/25/22 1940 04/25/22 2247 04/25/22 2350   BP: 126/85 130/82 (!) 156/89   BP Location:   RUE - Right upper extremity   Patient Position:   Sitting   Pulse: 70 65 65   Resp: 16 16 16   Temp: 99 °F (37.2 °C)  98.1 °F (36.7 °C)   TempSrc:   Oral   SpO2: 98% 100% 99%   Weight:   69 kg (152 lb 1.9 oz)   Height:   5' 6\" (1.676 m)                Procedures           Impression and Plan     The encounter diagnosis was Pain of left calf.      Disposition:  Time: 0128      Condition:  IMPROVED and STABLE    Discharged to Home .    Discharge Instructions were provided.    Follow Up:  Nallely Reyes  MD  [id number] [facility] [address]  [phone]             New Prescriptions    No medications on file         Counseled:  Patient, Regarding diagnosis, Regarding diagnostic results, Regarding treatment and Patient understood      DISCUSSION OF PLAN AND IMPORTANCE OF FOLLOW-UP CARE:  The plan was discussed verbally and key components were summarized in the discharge instructions. All questions were answered. In discussing management and follow-up, they are advised that the plan is based only on information that was available at the time of emergency department evaluation. Additional testing and treatment may be necessary, and outpatient evaluation is frequently required to ensure complete care. At the same time, there is always potential for symptoms to recur or worsen, or for additional signs or symptoms to develop that could substantially affect the treatment plan. If any new or uncontrolled symptoms occur, or if there are any other concerns, they are advised to seek medical evaluation immediately.      Signed:  Radha Chase PA-C  4/26/2022    Patient seen in conjunction with Dr. Chichi Chase PA-C  04/26/22 0129       Radha Chase PA-C  04/26/22 0219

## 2023-09-01 NOTE — ED ADULT TRIAGE NOTE - BRAND OF COVID-19 VACCINATION
Infusion Nursing Note:  Latoya Riddle presents today for INVANZ.    Patient seen by provider today: No   present during visit today: Not Applicable.    Note: N/A.      Intravenous Access:  PIV patent.    Treatment Conditions:  Not Applicable.      Post Infusion Assessment:  Patient tolerated infusion without incident.  Site patent and intact, free from redness, edema or discomfort.  No evidence of extravasations.  Access discontinued per protocol.       Discharge Plan:   Patient discharged in stable condition accompanied by: self.  Departure Mode: Ambulatory.      Jewel Babin RN     [employer] dose 1 and 2

## 2023-09-07 ENCOUNTER — EMERGENCY (EMERGENCY)
Facility: HOSPITAL | Age: 38
LOS: 0 days | Discharge: HOME | End: 2023-09-07
Attending: STUDENT IN AN ORGANIZED HEALTH CARE EDUCATION/TRAINING PROGRAM
Payer: MEDICAID

## 2023-09-07 VITALS
HEART RATE: 74 BPM | RESPIRATION RATE: 16 BRPM | SYSTOLIC BLOOD PRESSURE: 132 MMHG | TEMPERATURE: 98 F | OXYGEN SATURATION: 100 % | DIASTOLIC BLOOD PRESSURE: 66 MMHG

## 2023-09-07 VITALS
RESPIRATION RATE: 18 BRPM | HEART RATE: 65 BPM | TEMPERATURE: 99 F | HEIGHT: 64 IN | SYSTOLIC BLOOD PRESSURE: 122 MMHG | WEIGHT: 285.06 LBS | DIASTOLIC BLOOD PRESSURE: 87 MMHG | OXYGEN SATURATION: 98 %

## 2023-09-07 DIAGNOSIS — Z20.822 CONTACT WITH AND (SUSPECTED) EXPOSURE TO COVID-19: ICD-10-CM

## 2023-09-07 DIAGNOSIS — Z86.718 PERSONAL HISTORY OF OTHER VENOUS THROMBOSIS AND EMBOLISM: ICD-10-CM

## 2023-09-07 DIAGNOSIS — Z79.01 LONG TERM (CURRENT) USE OF ANTICOAGULANTS: ICD-10-CM

## 2023-09-07 DIAGNOSIS — J02.9 ACUTE PHARYNGITIS, UNSPECIFIED: ICD-10-CM

## 2023-09-07 DIAGNOSIS — H92.01 OTALGIA, RIGHT EAR: ICD-10-CM

## 2023-09-07 DIAGNOSIS — R07.0 PAIN IN THROAT: ICD-10-CM

## 2023-09-07 LAB
RAPID RVP RESULT: SIGNIFICANT CHANGE UP
SARS-COV-2 RNA SPEC QL NAA+PROBE: SIGNIFICANT CHANGE UP

## 2023-09-07 PROCEDURE — 99284 EMERGENCY DEPT VISIT MOD MDM: CPT

## 2023-09-07 RX ORDER — IBUPROFEN 200 MG
400 TABLET ORAL ONCE
Refills: 0 | Status: COMPLETED | OUTPATIENT
Start: 2023-09-07 | End: 2023-09-07

## 2023-09-07 RX ORDER — ACETAMINOPHEN 500 MG
975 TABLET ORAL ONCE
Refills: 0 | Status: COMPLETED | OUTPATIENT
Start: 2023-09-07 | End: 2023-09-07

## 2023-09-07 RX ADMIN — Medication 400 MILLIGRAM(S): at 22:00

## 2023-09-07 RX ADMIN — Medication 975 MILLIGRAM(S): at 21:00

## 2023-09-07 RX ADMIN — Medication 975 MILLIGRAM(S): at 22:00

## 2023-09-07 RX ADMIN — Medication 400 MILLIGRAM(S): at 21:00

## 2023-09-07 NOTE — ED ADULT TRIAGE NOTE - CHIEF COMPLAINT QUOTE
pt c/o sore throat and right ear pain for 2 days. pt states she was diagnosed with coivd a week ago. denies fever or chills. no history.

## 2023-09-07 NOTE — ED ADULT NURSE NOTE - OBJECTIVE STATEMENT
covering for primary RN Judy. patient is a&ox4 with R ear pain and throat pain that began 3 days. Patient states pain is 10/10 and feels sore. Patient states it is painful to swallow. Patient denies any SOB, fever, chills, n,v, diarrhea, dizziness, headaches. No swelling noted. Throat noted to be red and irritated.  No drainage noted. pmh blood clots on eliquis

## 2023-09-07 NOTE — ED PROVIDER NOTE - CLINICAL SUMMARY MEDICAL DECISION MAKING FREE TEXT BOX
37yo female with pmh dvt presenting with sore throat and R ear pain.  Sore throat started 3 days ago.  Over past day started developing R ear pain.  No fever, cough, sob, cp, n/v, abd pain, urinary symptoms.  Hasn't taken anything for this today.  No difficulty swallowing, tolerating secretions.  Comfortable appearing speaking in full sentences.  Exam reassuring.  Centor low.  Likely viral.  TM intact, unremarkable.  Plan for swab, meds, dc.

## 2023-09-07 NOTE — ED PROVIDER NOTE - NSFOLLOWUPINSTRUCTIONS_ED_ALL_ED_FT
Rest, drink plenty of fluids  Advance activity as tolerated  Continue all previously prescribed medications as directed  Follow up with your PMD - bring copies of your results  Return to the ER for severe pain, difficulty breathing, or other new or concerning symptoms   For pain, you may take Tylenol 975mg every six hours and supplement with ibuprofen 600mg, with food, every six hours which can be taken three hours apart from the Tylenol to have a layered effect.

## 2023-09-07 NOTE — ED PROVIDER NOTE - PATIENT PORTAL LINK FT
You can access the FollowMyHealth Patient Portal offered by [facility] by registering at the following website: [email or web address]. By joining [facility]’s FollowMyHealth portal, you will also be able to view your health information using other applications (apps) compatible with our system.

## 2023-09-07 NOTE — ED ADULT NURSE NOTE - NSFALLHARMRISKINTERV_ED_ALL_ED

## 2023-09-07 NOTE — ED PROVIDER NOTE - PHYSICAL EXAMINATION
General appearance: Nontoxic appearing, conversant, afebrile    Eyes: anicteric sclerae, EARNEST, EOMI   HENT: Atraumatic; oropharynx clear, MMM and no ulcerations, no pharyngeal erythema or exudate, b/l TM intact, no erythema, bulging   Neck: Trachea midline; Full range of motion, supple   Pulm: CTA bl, normal respiratory effort and no intercostal retractions, normal work of breathing   CV: RRR, No murmurs, rubs, or gallops   Abdomen: Soft, non-tender, non-distended; no guarding or rebound   Extremities: No peripheral edema, no gross deformities, FROM x4   Skin: Dry, normal temperature, turgor and texture; no rash   Psych: Appropriate affect, cooperative

## 2023-09-07 NOTE — ED PROVIDER NOTE - OBJECTIVE STATEMENT
37yo female with no pmh presenting with sore throat and R ear pain.  Sore throat started 3 days ago.  Over past day started developing R ear pain.  No fever, cough, sob, cp, n/v, abd pain, urinary symptoms.  Hasn't taken anything for this today. See mdm

## 2023-09-08 ENCOUNTER — TRANSCRIPTION ENCOUNTER (OUTPATIENT)
Age: 38
End: 2023-09-08

## 2023-09-08 PROBLEM — I82.409 ACUTE EMBOLISM AND THROMBOSIS OF UNSPECIFIED DEEP VEINS OF UNSPECIFIED LOWER EXTREMITY: Chronic | Status: ACTIVE | Noted: 2022-02-19

## 2023-09-08 LAB — S PYO DNA THROAT QL NAA+PROBE: ABNORMAL

## 2023-09-09 ENCOUNTER — EMERGENCY (EMERGENCY)
Facility: HOSPITAL | Age: 38
LOS: 1 days | Discharge: HOME | End: 2023-09-09
Attending: EMERGENCY MEDICINE | Admitting: EMERGENCY MEDICINE
Payer: MEDICAID

## 2023-09-09 VITALS
OXYGEN SATURATION: 100 % | TEMPERATURE: 100 F | RESPIRATION RATE: 18 BRPM | HEIGHT: 64 IN | SYSTOLIC BLOOD PRESSURE: 116 MMHG | DIASTOLIC BLOOD PRESSURE: 78 MMHG | HEART RATE: 88 BPM

## 2023-09-09 PROCEDURE — 99284 EMERGENCY DEPT VISIT MOD MDM: CPT

## 2023-09-09 RX ORDER — DEXAMETHASONE 0.5 MG/5ML
4 ELIXIR ORAL ONCE
Refills: 0 | Status: COMPLETED | OUTPATIENT
Start: 2023-09-09 | End: 2023-09-09

## 2023-09-09 RX ORDER — ACETAMINOPHEN 500 MG
650 TABLET ORAL ONCE
Refills: 0 | Status: COMPLETED | OUTPATIENT
Start: 2023-09-09 | End: 2023-09-09

## 2023-09-09 RX ORDER — AMOXICILLIN 250 MG/5ML
1 SUSPENSION, RECONSTITUTED, ORAL (ML) ORAL
Qty: 20 | Refills: 0
Start: 2023-09-09 | End: 2023-09-18

## 2023-09-09 RX ORDER — IBUPROFEN 200 MG
600 TABLET ORAL ONCE
Refills: 0 | Status: COMPLETED | OUTPATIENT
Start: 2023-09-09 | End: 2023-09-09

## 2023-09-09 RX ORDER — DEXAMETHASONE 0.5 MG/5ML
6 ELIXIR ORAL ONCE
Refills: 0 | Status: COMPLETED | OUTPATIENT
Start: 2023-09-09 | End: 2023-09-09

## 2023-09-09 RX ADMIN — Medication 4 MILLIGRAM(S): at 13:31

## 2023-09-09 RX ADMIN — Medication 650 MILLIGRAM(S): at 13:17

## 2023-09-09 RX ADMIN — Medication 6 MILLIGRAM(S): at 13:31

## 2023-09-09 RX ADMIN — Medication 600 MILLIGRAM(S): at 13:40

## 2023-09-09 NOTE — ED ADULT TRIAGE NOTE - CHIEF COMPLAINT QUOTE
Pt c/o sore throat, pain with speaking x 5 days. Pt diagnosed with strep throat at [facility] ED 2 days ago. Returning for unrelieved pain. Denies fevers, difficulty breathing.

## 2023-09-09 NOTE — ED PROVIDER NOTE - PATIENT PORTAL LINK FT
You can access the FollowMyHealth Patient Portal offered by [facility] by registering at the following website: [email or web address]. By joining [facility]’s FollowMyHealth portal, you will also be able to view your health information using other applications (apps) compatible with our system.

## 2023-09-09 NOTE — ED PROVIDER NOTE - ATTENDING CONTRIBUTION TO CARE
Dr. Bell: 39 yo female with no sig PMH, in ED with 2 days of sore throat.  Was seen at [facility] for this 2 days ago, had positive rapid strep, was sent amox but did not realize and so has not been taking abx.  Pt endorses sore throat and fever, has not taken anything for pain or fever.  She is able to eat and drink but with pain.  No CP/SOB, N/V/D or abdominal pain.  Oral cavity with posterior erythema but patent, teeth intact, uvula midline, tongue midline without elevation.  Tonsils both mildly erythematous and with slight exudates on left tonsil.  Speech normal.  No facial swelling noted, including submandibular.  Discussed with pt that abx had been sent to pharmacy and she must use them as directed, and also will treat symptoms in ED.

## 2023-09-09 NOTE — ED PROVIDER NOTE - PHYSICAL EXAMINATION
GEN: Patient awake and alert. No acute distress.  Head: normocephalic, atraumatic.  Neck: Nontender, full ROM. No LAD.  Eyes: PERRLA b/l. EOMI, no scleral icterus, no conjunctival injection. Moist mucous membranes.  ENT: Nasal passages patent without exudate, purulence or marked erythema/edema. Throat with left sided exudate and erythema. Oropharynx patient, tonsils not touching. Uvula midline, no vesicles.   CARDIAC: RRR. Normal S1, S2. No murmur, rubs, or gallops. No peripheral edema noted.  PULM: CTA B/L no wheeze, rales or rhonchi. No signs of respiratory distress, no accessory muscle usage or nasal flaring.  ABD: Soft, nontender, nondistended. No rebound, no involuntary guarding. BS x 4, no auscultated bruit. No HSM appreciated.  : No CVA tenderness, no suprapubic tenderness.  MSK: Moving all extremities. 5/5 strength and full ROM in all extremities. No obvious deformity.  NEURO: A&Ox3, no focal neurological deficits, CN 2-12 grossly intact. FTN and HTS coordination intact. Gait normal.  SKIN: warm, dry, no rash, no lesions, no open wounds. No urticaria.

## 2023-09-09 NOTE — ED ADULT NURSE NOTE - OBJECTIVE STATEMENT
Pt c/o intermittent diffuse abdominal pain and bloating x 5 days. Pain is relieved by Mylanta, comfortable in triage. Pt sent to ED by PCP for RLQ tenderness. Denies N/V/D, fevers.  Patient A&OX4.  No distress noted.  Breathing non-labored.  Meds given and tolerated well.  Plan of care in progress.

## 2023-09-09 NOTE — ED ADULT NURSE NOTE - CHIEF COMPLAINT QUOTE
Pt c/o sore throat, pain with speaking x 5 days. Pt diagnosed with strep throat at [facility] ED 2 days ago. Returning for unrelieved pain. Denies fevers, difficulty breathing.

## 2023-09-09 NOTE — ED PROVIDER NOTE - DIFFERENTIAL DIAGNOSIS
known strep pharyngitis; not consistent with peritonsillar abscess or retropharyngeal abscess Differential Diagnosis

## 2023-09-09 NOTE — ED PROVIDER NOTE - CLINICAL SUMMARY MEDICAL DECISION MAKING FREE TEXT BOX
38-year-old female with no significant past medical history presenting emergency department after being discharged from [facility] 2 days ago now with positive strep culture.  Patient was prescribed 500 mg amoxicillin twice daily but was not aware that they were sent to her pharmacy has not taken yet.  Also has not tried Tylenol Motrin for pain/fevers.  Return to emergency department for continued sore throat.  Able to tolerate p.o. intake.  Exam notable for left pharyngeal exudate, uvula midline no peritonsillar bulging.  Low suspicion for PTA .  Will give Tylenol/ibuprofen for pain/fever.  Confirmed prescription for amoxicillin was found to send to pharmacy.  Counseled patient on necessity of taking antibiotics to cure her strep throat.  Otherwise without any new medical complaints.  Hemodynamically stable.  No respiratory distress or concerns for airway.  Dispo Home and advised to begin antibiotic course.  Will give strict return precautions. 38-year-old female with no significant past medical history presenting emergency department after being discharged from [facility] 2 days ago now with positive strep culture.  Patient was prescribed 500 mg amoxicillin twice daily but was not aware that they were sent to her pharmacy has not taken yet.  Also has not tried Tylenol Motrin for pain/fevers.  Return to emergency department for continued sore throat.  Able to tolerate p.o. intake.  Exam notable for left pharyngeal exudate, uvula midline no peritonsillar bulging.  Low suspicion for PTA .  Will give Tylenol/ibuprofen for pain/fever.  Confirmed prescription for amoxicillin was found to send to pharmacy.  Counseled patient on necessity of taking antibiotics to cure her strep throat.  Otherwise without any new medical complaints.  Hemodynamically stable.  No respiratory distress or concerns for airway.  Dispo Home and advised to begin antibiotic course.  Will give strict return precautions.

## 2023-09-09 NOTE — ED PROVIDER NOTE - OBJECTIVE STATEMENT
38 year-old female with no significant past medical history presenting emergency department with x2 days of sore throat.  Recently seen at [facility] for similar symptoms 2 days ago.  Strep test was positive after being discharged.  Patient was prescribed amoxicillin 500 twice daily.  Reportedly was not aware that she was prescribed antibiotics and has not started yet.  Has not taken any Tylenol or Motrin for pain/fevers.  Coming back to the ED for continued worsening throat pain.  Denies chest pain, palpitations, shortness of breath, ABD pain, N/V/D, urinary symptoms and all other medical complaints besides fever and sore throat.  Patient reports she is able to tolerate p.o. intake. 38 year-old female with no significant past medical history presenting emergency department with x2 days of sore throat.  Recently seen at [facility] for similar symptoms 2 days ago.  Strep test was positive after being discharged.  Patient was prescribed amoxicillin 500 twice daily.  Reportedly was not aware that she was prescribed antibiotics and has not started yet.  Has not taken any Tylenol or Motrin for pain/fevers.  Coming back to the ED for continued worsening throat pain.  Denies chest pain, palpitations, shortness of breath, ABD pain, N/V/D, urinary symptoms and all other medical complaints besides fever and sore throat.  Patient reports she is able to tolerate p.o. intake.

## 2023-09-09 NOTE — ED ADULT NURSE NOTE - NSFALLUNIVINTERV_ED_ALL_ED
Bed/Stretcher in lowest position, wheels locked, appropriate side rails in place/Call bell, personal items and telephone in reach/Instruct patient to call for assistance before getting out of bed/chair/stretcher/Non-slip footwear applied when patient is off stretcher/[place] to call system/Physically safe environment - no spills, clutter or unnecessary equipment/Purposeful proactive rounding/Room/bathroom lighting operational, light cord in reach

## 2023-09-09 NOTE — ED PROVIDER NOTE - NSFOLLOWUPINSTRUCTIONS_ED_ALL_ED_FT
You were seen in the ED for continued sore throat symptoms. Your throat culture was positive for strep throat. You were prescribed amoxicillin 500mg twice a day. This prescription was sent to your pharmacy and you should continue to take the antibiotic as prescribed for the entire course to treat your infection. You were given tylenol and motrin in the ED for pain/fever. You were also given a one time steroid to help alleviate some inflammation/swelling.     You may take 600mg Ibuprofen (Advil) once every 6 hours as needed for pain. See medication label for warnings and use instructions.  You may take 975 mg Tylenol (acetaminophen) every six hours as needed for pain.    Please follow up with your PCP within 1-3 days.     1. TAKE ALL MEDICATIONS AS DIRECTED.    2. FOR PAIN OR FEVER YOU CAN TAKE IBUPROFEN (MOTRIN, ADVIL) OR ACETAMINOPHEN (TYLENOL) AS NEEDED, AS DIRECTED ON PACKAGING.  3. FOLLOW UP WITH YOUR PRIMARY DOCTOR WITHIN 5 DAYS AS DIRECTED.  4. IF YOU HAD LABS OR IMAGING DONE, YOU WERE GIVEN COPIES OF ALL LABS AND/OR IMAGING RESULTS FROM YOUR ER VISIT--PLEASE TAKE THEM WITH YOU TO YOUR FOLLOW UP APPOINTMENTS.  5. RETURN TO THE ER FOR ANY WORSENING SYMPTOMS OR CONCERNS.     -------------------------------------------------------------------------------------------------------------------------    Strep Throat, Adult  Strep throat is an infection in the throat that is caused by bacteria. It is common during the cold months of the year. It mostly affects children who are 5–15 years old. However, people of all ages can get it at any time of the year. This infection spreads from person to person (is contagious) through coughing, sneezing, or having close contact.    Your health care provider may use other names to describe the infection. When strep throat affects the tonsils, it is called tonsillitis. When it affects the back of the throat, it is called pharyngitis.    What are the causes?  This condition is caused by the Streptococcus pyogenes bacteria.    What increases the risk?  You are more likely to develop this condition if:  You care for school-age children, or are around school-age children. Children are more likely to get strep throat and may spread it to others.  You spend time in crowded places where the infection can spread easily.  You have close contact with someone who has strep throat.  What are the signs or symptoms?  Symptoms of this condition include:  Fever or chills.  Redness, swelling, or pain in the tonsils or throat.  Pain or difficulty when swallowing.  White or yellow spots on the tonsils or throat.  Tender glands in the neck and under the jaw.  Bad smelling breath.  Red rash all over the body. This is rare.  How is this diagnosed?  A sample is taken from a person's throat.   This condition is diagnosed by tests that check for the presence and the amount of bacteria that cause strep throat. They are:  Rapid strep test. Your throat is swabbed and checked for the presence of bacteria. Results are usually ready in minutes.  Throat culture test. Your throat is swabbed. The sample is placed in a cup that allows infections to grow. Results are usually ready in 1 or 2 days.  How is this treated?  This condition may be treated with:  Medicines that kill germs (antibiotics).  Medicines that relieve pain or fever. These include:  Ibuprofen or acetaminophen.  Aspirin, only for people who are over the age of 18.  Throat lozenges.  Throat sprays.  Follow these instructions at home:  Medicines    A prescription pill bottle with an example of a pill.  Take over-the-counter and prescription medicines only as told by your health care provider.  Take your antibiotic medicine as told by your health care provider. Do not stop taking the antibiotic even if you start to feel better.  Eating and drinking    A diet of soft foods, including applesauce, yogurt, ice cream, and a smoothie.  If you have trouble swallowing, try eating soft foods until your sore throat feels better.  Drink enough fluid to keep your urine pale yellow.  To help relieve pain, you may have:  Warm fluids, such as soup and tea.  Cold fluids, such as frozen desserts or popsicles.  General instructions    Gargle with a salt-water mixture 3–4 times a day or as needed. To make a salt-water mixture, completely dissolve ½–1 tsp (3–6 g) of salt in 1 cup (237 mL) of warm water.  Get plenty of rest.  Stay home from work or school until you have been taking antibiotics for 24 hours.  Do not use any products that contain nicotine or tobacco. These products include cigarettes, chewing tobacco, and vaping devices, such as e-cigarettes. If you need help quitting, ask your health care provider.  It is up to you to get your test results. Ask your health care provider, or the department that is doing the test, when your results will be ready.  Keep all follow-up visits. This is important.  How is this prevented?  Washing hands with soap and water.  Do not share food, drinking cups, or personal items that could cause the infection to spread to other people.  Wash your hands often with soap and water for at least 20 seconds. If soap and water are not available, use hand . Make sure that all people in your house wash their hands well.  Have family members tested if they have a sore throat or fever. They may need an antibiotic if they have strep throat.  Contact a health care provider if:  You have swelling in your neck that keeps getting bigger.  You develop a rash, cough, or earache.  You cough up a thick mucus that is green, yellow-brown, or bloody.  You have pain or discomfort that does not get better with medicine.  Your symptoms seem to be getting worse.  You have a fever.  Get help right away if:  You have new symptoms, such as vomiting, severe headache, stiff or painful neck, chest pain, or shortness of breath.  You have severe throat pain, drooling, or changes in your voice.  You have swelling of the neck, or the skin on the neck becomes red and tender.  You have signs of dehydration, such as tiredness (fatigue), dry mouth, and decreased urination.  You become increasingly sleepy, or you cannot wake up completely.  Your joints become red or painful.  These symptoms may represent a serious problem that is an emergency. Do not wait to see if the symptoms will go away. Get medical help right away. Call your local emergency services (911 in the [place] Do not drive yourself to the hospital.    Summary  Strep throat is an infection in the throat that is caused by the Streptococcus pyogenes bacteria. This infection is spread from person to person (is contagious) through coughing, sneezing, or having close contact.  Take your medicines, including antibiotics, as told by your health care provider. Do not stop taking the antibiotic even if you start to feel better.  To prevent the spread of germs, wash your hands well with soap and water. Have others do the same. Do not share food, drinking cups, or personal items.  Get help right away if you have new symptoms, such as vomiting, severe headache, stiff or painful neck, chest pain, or shortness of breath.

## 2024-02-22 NOTE — ED ADULT TRIAGE NOTE - CHIEF COMPLAINT QUOTE
Pt walk in c/o Dizziness/lightheaded for 3 days, worse today. Denies HA CP SOB palpitation N V sweating PMhx on Eliquis for Blood clots on left leg no

## 2024-10-13 NOTE — ED PROVIDER NOTE - CPE EDP SKIN NORM
Patient now with percutaneous 14 [place] during placed on previous admission.  Drain remains in place    CT showed new fluid collection    IR team placed a new drain 10/09    Abcs was changed to Ertapenem according to the fluid culture sens result , anticipate at least 2 weeks of iv abcs    When drain output is less than 10 cc in 24hr then would repeat CT to evaluate for drainage removal   normal...